# Patient Record
Sex: FEMALE | Race: WHITE | Employment: FULL TIME | URBAN - METROPOLITAN AREA
[De-identification: names, ages, dates, MRNs, and addresses within clinical notes are randomized per-mention and may not be internally consistent; named-entity substitution may affect disease eponyms.]

---

## 2019-04-08 ENCOUNTER — OFFICE VISIT (OUTPATIENT)
Dept: FAMILY MEDICINE CLINIC | Age: 34
End: 2019-04-08
Payer: COMMERCIAL

## 2019-04-08 VITALS
SYSTOLIC BLOOD PRESSURE: 154 MMHG | HEART RATE: 102 BPM | OXYGEN SATURATION: 98 % | TEMPERATURE: 98.2 F | DIASTOLIC BLOOD PRESSURE: 86 MMHG | WEIGHT: 204.8 LBS

## 2019-04-08 DIAGNOSIS — E11.9 TYPE 2 DIABETES MELLITUS WITHOUT COMPLICATION, WITHOUT LONG-TERM CURRENT USE OF INSULIN (HCC): Primary | ICD-10-CM

## 2019-04-08 DIAGNOSIS — Z13.220 LIPID SCREENING: ICD-10-CM

## 2019-04-08 DIAGNOSIS — Z11.4 ENCOUNTER FOR SCREENING FOR HIV: ICD-10-CM

## 2019-04-08 LAB — HBA1C MFR BLD: 9.8 %

## 2019-04-08 PROCEDURE — 99203 OFFICE O/P NEW LOW 30 MIN: CPT | Performed by: NURSE PRACTITIONER

## 2019-04-08 PROCEDURE — 83036 HEMOGLOBIN GLYCOSYLATED A1C: CPT | Performed by: NURSE PRACTITIONER

## 2019-04-08 RX ORDER — CETIRIZINE HYDROCHLORIDE 10 MG/1
10 TABLET ORAL DAILY
COMMUNITY

## 2019-04-08 RX ORDER — METFORMIN HYDROCHLORIDE 500 MG/1
1000 TABLET, EXTENDED RELEASE ORAL
Qty: 60 TABLET | Refills: 0 | Status: SHIPPED | OUTPATIENT
Start: 2019-04-15 | End: 2019-04-22 | Stop reason: SDUPTHER

## 2019-04-08 RX ORDER — BLOOD-GLUCOSE METER
1 KIT MISCELLANEOUS DAILY
Qty: 1 KIT | Refills: 0 | Status: SHIPPED | OUTPATIENT
Start: 2019-04-08

## 2019-04-08 RX ORDER — METFORMIN HYDROCHLORIDE 500 MG/1
500 TABLET, EXTENDED RELEASE ORAL
Qty: 7 TABLET | Refills: 0 | Status: SHIPPED | OUTPATIENT
Start: 2019-04-08 | End: 2019-04-22 | Stop reason: ALTCHOICE

## 2019-04-08 RX ORDER — ACYCLOVIR 400 MG/1
400 TABLET ORAL DAILY
COMMUNITY
Start: 2019-04-08 | End: 2020-04-08 | Stop reason: SDUPTHER

## 2019-04-08 RX ORDER — LANCETS 30 GAUGE
1 EACH MISCELLANEOUS 2 TIMES DAILY
Qty: 100 EACH | Refills: 5 | Status: SHIPPED | OUTPATIENT
Start: 2019-04-08

## 2019-04-08 RX ORDER — BLOOD SUGAR DIAGNOSTIC
1 STRIP MISCELLANEOUS DAILY
Qty: 100 EACH | Refills: 1 | Status: SHIPPED | OUTPATIENT
Start: 2019-04-08

## 2019-04-08 SDOH — HEALTH STABILITY: MENTAL HEALTH: HOW OFTEN DO YOU HAVE A DRINK CONTAINING ALCOHOL?: MONTHLY OR LESS

## 2019-04-08 ASSESSMENT — ENCOUNTER SYMPTOMS
VOMITING: 0
DIARRHEA: 0
NAUSEA: 0
RESPIRATORY NEGATIVE: 1

## 2019-04-08 NOTE — PATIENT INSTRUCTIONS
Patient Education        Learning About Diabetes Food Guidelines  Your Care Instructions    Meal planning is important to manage diabetes. It helps keep your blood sugar at a target level (which you set with your doctor). You don't have to eat special foods. You can eat what your family eats, including sweets once in a while. But you do have to pay attention to how often you eat and how much you eat of certain foods. You may want to work with a dietitian or a certified diabetes educator (CDE) to help you plan meals and snacks. A dietitian or CDE can also help you lose weight if that is one of your goals. What should you know about eating carbs? Managing the amount of carbohydrate (carbs) you eat is an important part of healthy meals when you have diabetes. Carbohydrate is found in many foods. · Learn which foods have carbs. And learn the amounts of carbs in different foods. ? Bread, cereal, pasta, and rice have about 15 grams of carbs in a serving. A serving is 1 slice of bread (1 ounce), ½ cup of cooked cereal, or 1/3 cup of cooked pasta or rice. ? Fruits have 15 grams of carbs in a serving. A serving is 1 small fresh fruit, such as an apple or orange; ½ of a banana; ½ cup of cooked or canned fruit; ½ cup of fruit juice; 1 cup of melon or raspberries; or 2 tablespoons of dried fruit. ? Milk and no-sugar-added yogurt have 15 grams of carbs in a serving. A serving is 1 cup of milk or 2/3 cup of no-sugar-added yogurt. ? Starchy vegetables have 15 grams of carbs in a serving. A serving is ½ cup of mashed potatoes or sweet potato; 1 cup winter squash; ½ of a small baked potato; ½ cup of cooked beans; or ½ cup cooked corn or green peas. · Learn how much carbs to eat each day and at each meal. A dietitian or CDE can teach you how to keep track of the amount of carbs you eat. This is called carbohydrate counting. · If you are not sure how to count carbohydrate grams, use the Plate Method to plan meals.  It is a good, quick way to make sure that you have a balanced meal. It also helps you spread carbs throughout the day. ? Divide your plate by types of foods. Put non-starchy vegetables on half the plate, meat or other protein food on one-quarter of the plate, and a grain or starchy vegetable in the final quarter of the plate. To this you can add a small piece of fruit and 1 cup of milk or yogurt, depending on how many carbs you are supposed to eat at a meal.  · Try to eat about the same amount of carbs at each meal. Do not \"save up\" your daily allowance of carbs to eat at one meal.  · Proteins have very little or no carbs per serving. Examples of proteins are beef, chicken, turkey, fish, eggs, tofu, cheese, cottage cheese, and peanut butter. A serving size of meat is 3 ounces, which is about the size of a deck of cards. Examples of meat substitute serving sizes (equal to 1 ounce of meat) are 1/4 cup of cottage cheese, 1 egg, 1 tablespoon of peanut butter, and ½ cup of tofu. How can you eat out and still eat healthy? · Learn to estimate the serving sizes of foods that have carbohydrate. If you measure food at home, it will be easier to estimate the amount in a serving of restaurant food. · If the meal you order has too much carbohydrate (such as potatoes, corn, or baked beans), ask to have a low-carbohydrate food instead. Ask for a salad or green vegetables. · If you use insulin, check your blood sugar before and after eating out to help you plan how much to eat in the future. · If you eat more carbohydrate at a meal than you had planned, take a walk or do other exercise. This will help lower your blood sugar. What else should you know? · Limit saturated fat, such as the fat from meat and dairy products. This is a healthy choice because people who have diabetes are at higher risk of heart disease. So choose lean cuts of meat and nonfat or low-fat dairy products.  Use olive or canola oil instead of butter or shortening healthy weight if that is one of your goals. What plan is right for you? Your dietitian or diabetes educator may suggest that you start with the plate format or carbohydrate counting. The plate format  The plate format is a simple way to help you manage how you eat. You plan meals by learning how much space each food should take on a plate. Using the plate format helps you spread carbohydrate throughout the day. It can make it easier to keep your blood sugar level within your target range. It also helps you see if you're eating healthy portion sizes. To use the plate format, you put non-starchy vegetables on half your plate. Add meat or meat substitutes on one-quarter of the plate. Put a grain or starchy vegetable (such as brown rice or a potato) on the final quarter of the plate. You can add a small piece of fruit and some low-fat or fat-free milk or yogurt, depending on your carbohydrate goal for each meal.  Here are some tips for using the plate format:  · Make sure that you are not using an oversized plate. A 9-inch plate is best. Many restaurants use larger plates. · Get used to using the plate format at home. Then you can use it when you eat out. · Write down your questions about using the plate format. Talk to your doctor, a dietitian, or a diabetes educator about your concerns. Carbohydrate counting  With carbohydrate counting, you plan meals based on the amount of carbohydrate in each food. Carbohydrate raises blood sugar higher and more quickly than any other nutrient. It is found in desserts, breads and cereals, and fruit. It's also found in starchy vegetables such as potatoes and corn, grains such as rice and pasta, and milk and yogurt. Spreading carbohydrate throughout the day helps keep your blood sugar levels within your target range.   Your daily amount depends on several things, including your weight, how active you are, which diabetes medicines you take, and what your goals are for your blood too.  · Use cookbooks or online recipes to plan several main meals. Plan some quick meals for busy nights. You also can double some recipes that freeze well. Then you can save half for other busy nights when you don't have time to cook. · Make sure you have the ingredients you need for your recipes. If you're running low on basic items, put these items on your shopping list too. · List foods that you use to make breakfasts, lunches, and snacks. List plenty of fruits and vegetables. · Post this list on the refrigerator. Add to it as you think of more things you need. · Take the list to the store to do your weekly shopping. Follow-up care is a key part of your treatment and safety. Be sure to make and go to all appointments, and call your doctor if you are having problems. It's also a good idea to know your test results and keep a list of the medicines you take. Where can you learn more? Go to https://[employer]. org and sign in to your [employer] account. Enter U186 in the [employer] box to learn more about \"Learning About Meal Planning for Diabetes. \"     If you do not have an account, please click on the \"Sign Up Now\" link. Current as of: July 25, 2018  Content Version: 11.9  © 8191-2276 [employer]. Care instructions adapted under license by [facility] [place] ([facility]). If you have questions about a medical condition or this instruction, always ask your healthcare professional. [place] [address] any warranty or liability for your use of this information. Patient Education        Learning About Metformin for Type 2 Diabetes  Introduction    Metformin (such as Glucophage) is a medicine used to treat type 2 diabetes. It helps keep blood sugar levels on target. You may have tried to eat a healthy diet, lose weight, and get more exercise to keep your blood sugar in your target range. If those things do not help, you may take a medicine called metformin.  It

## 2019-04-08 NOTE — PROGRESS NOTES
Paul French   28 y.o.  female  [id number]      Chief Complaint   Patient presents with    Discuss Medications        Subjective:  28 y. o.female is here for a follow up. She has the following chronic/acute medical problems: There is no problem list on file for this patient. Patient is here today with complaint of Type 2 diabetes. Patient states she has been off of her medications for a month or two. She states she was having trouble tolerating the medication. Patient states she is willing to try the Metformin XR. Patient is also concerned with her lipid levels. Patient states those numbers were not fasting. Review of Systems   Constitutional: Negative for appetite change, chills, fatigue and fever. HENT: Negative. Respiratory: Negative. Cardiovascular: Negative for chest pain and palpitations. Gastrointestinal: Negative for diarrhea, nausea and vomiting. Endocrine: Negative for polydipsia, polyphagia and polyuria. Skin: Negative for rash. Neurological: Negative for dizziness, light-headedness and headaches. Current Outpatient Medications   Medication Sig Dispense Refill    acyclovir (ZOVIRAX) 400 MG tablet       SPRINTEC 28 0.25-35 MG-MCG per tablet TAKE 1 TABLET BY MOUTH EVERY DAY AS DIRECTED  11    cetirizine (ZYRTEC ALLERGY) 10 MG tablet Take 10 mg by mouth daily      [START ON 4/15/2019] metFORMIN (GLUCOPHAGE-XR) 500 MG extended release tablet Take 2 tablets by mouth Daily with supper 60 tablet 0    metFORMIN (GLUCOPHAGE-XR) 500 MG extended release tablet Take 1 tablet by mouth Daily with supper for 7 days 7 tablet 0    glucose monitoring kit (FREESTYLE) monitoring kit 1 kit by Does not apply route daily Please dispense what insurance pays for.  1 kit 0    Lancets MISC 1 each by Does not apply route 2 times daily 100 each 5    Alcohol Swabs (ALCOHOL PADS) 70 % PADS 1 applicator by Does not apply route daily 100 each 1    blood glucose test strips (EXACTECH TEST) strip Blood sugar check daily Please dispense what insurance pays for. 100 each 3     No current facility-administered medications for this visit. Past medical, family,surgical history reviewed today. Objective:  BP (!) 154/86   Pulse 102   Temp 98.2 °F (36.8 °C) (Oral)   Wt 204 lb 12.8 oz (92.9 kg)   LMP 03/19/2019 (Exact Date)   SpO2 98%   Breastfeeding? No   BP Readings from Last 3 Encounters:   04/08/19 (!) 154/86     Wt Readings from Last 3 Encounters:   04/08/19 204 lb 12.8 oz (92.9 kg)         Physical Exam   Constitutional: She is oriented to person, place, and time. She appears well-developed and well-nourished. HENT:   Head: Normocephalic. Neck: Neck supple. Cardiovascular: Normal rate, regular rhythm and normal heart sounds. Pulmonary/Chest: Effort normal and breath sounds normal.   Neurological: She is alert and oriented to person, place, and time. Skin: Skin is warm and dry. Psychiatric: She has a normal mood and affect.  Her behavior is normal.       Lab Results   Component Value Date    WBC 9.8 01/24/2011    HGB 14.4 01/24/2011    HCT 42.5 01/24/2011    MCV 87.6 01/24/2011     (H) 01/24/2011     Lab Results   Component Value Date     01/24/2011    K 4.5 01/24/2011     01/24/2011    CO2 25 01/24/2011    BUN 9 01/24/2011    CREATININE 0.7 01/24/2011    CALCIUM 9.8 01/24/2011    PROT 7.6 01/24/2011    LABALBU 4.9 01/24/2011    BILITOT 0.4 01/24/2011    ALKPHOS 81 01/24/2011    AST 25 01/24/2011    ALT 29 01/24/2011    LABGLOM >60 01/24/2011    GFRAA >60 01/24/2011     Lab Results   Component Value Date    CHOL 251 (H) 01/24/2011     Lab Results   Component Value Date    TRIG 206 (H) 01/24/2011     Lab Results   Component Value Date    HDL 52 (L) 01/24/2011     No results found for: [address], LDLCHOLESTEROL  Lab Results   Component Value Date    LABA1C 9.8 04/08/2019     Lab Results   Component Value Date    TSHHS 2.063 01/24/2011     Results for orders placed or performed in visit on 04/08/19   POCT glycosylated hemoglobin (Hb A1C)   Result Value Ref Range    Hemoglobin A1C 9.8 %         ASSESSMENT/PLAN:      1. Type 2 diabetes mellitus without complication, without long-term current use of insulin ([facility]  Advised patient to check her blood sugars first thing in the AM and keep a log and bring to next appointment. Discussed lifestyle modifications. Attached diabetic teaching handouts to after visit summary. Patient to follow up in two weeks. Advised patient to call sooner if unable to tolerate the medication.   - POCT glycosylated hemoglobin (Hb A1C)  - metFORMIN (GLUCOPHAGE-XR) 500 MG extended release tablet; Take 2 tablets by mouth Daily with supper  Dispense: 60 tablet; Refill: 0  - metFORMIN (GLUCOPHAGE-XR) 500 MG extended release tablet; Take 1 tablet by mouth Daily with supper for 7 days  Dispense: 7 tablet; Refill: 0  - glucose monitoring kit (FREESTYLE) monitoring kit; 1 kit by Does not apply route daily Please dispense what insurance pays for. Dispense: 1 kit; Refill: 0  - Lancets MISC; 1 each by Does not apply route 2 times daily  Dispense: 100 each; Refill: 5  - Alcohol Swabs (ALCOHOL PADS) 70 % PADS; 1 applicator by Does not apply route daily  Dispense: 100 each; Refill: 1  - blood glucose test strips (EXACTECH TEST) strip; Blood sugar check daily Please dispense what insurance pays for. Dispense: 100 each; Refill: 3  - CBC WITH AUTO DIFFERENTIAL; Future  - COMPREHENSIVE METABOLIC PANEL; Future    2. Lipid screening  - LIPID PANEL; Future    3. Encounter for screening for HIV  - HIV-1 AND HIV-2 ANTIBODIES; Future    The patients records were reviewed and discussed. Time was given for questions . All questions were answered to the patients satisfaction. A total time of 30 was spent with at least 50% related to counseling and coordination of care. There are no discontinued medications. Care discussed with patient. Questions answered. Patient verbalizes understanding and agrees with plan. After visit summary provided. Advised to call for any problems, questions, or concerns. Return in about 2 weeks (around 4/22/2019).                                          Signed:  NEHA Cortes CNP  04/08/19  7:39 PM

## 2019-04-16 ENCOUNTER — HOSPITAL ENCOUNTER (OUTPATIENT)
Age: 34
Discharge: HOME | End: 2019-04-16
Payer: COMMERCIAL

## 2019-04-16 LAB
ALBUMIN SERPL-MCNC: 4.1 GM/DL (ref 3.4–5)
ALP BLD-CCNC: 75 IU/L (ref 40–129)
ALT SERPL-CCNC: 19 U/L (ref 10–40)
ANION GAP SERPL CALCULATED.3IONS-SCNC: 15 MMOL/L (ref 4–16)
AST SERPL-CCNC: 25 IU/L (ref 15–37)
BASOPHILS ABSOLUTE: 0.1 K/CU MM
BASOPHILS RELATIVE PERCENT: 1 % (ref 0–1)
BILIRUB SERPL-MCNC: 0.2 MG/DL (ref 0–1)
BUN BLDV-MCNC: 8 MG/DL (ref 6–23)
CALCIUM SERPL-MCNC: 9.2 MG/DL (ref 8.3–10.6)
CHLORIDE BLD-SCNC: 105 MMOL/L (ref 99–110)
CHOLESTEROL: 300 MG/DL
CO2: 23 MMOL/L (ref 21–32)
CREAT SERPL-MCNC: 0.6 MG/DL (ref 0.6–1.1)
DIFFERENTIAL TYPE: ABNORMAL
EOSINOPHILS ABSOLUTE: 0.7 K/CU MM
EOSINOPHILS RELATIVE PERCENT: 8.6 % (ref 0–3)
GFR AFRICAN AMERICAN: >60 ML/MIN/1.73M2
GFR NON-AFRICAN AMERICAN: >60 ML/MIN/1.73M2
GLUCOSE FASTING: 159 MG/DL (ref 70–99)
HCT VFR BLD CALC: 43.5 % (ref 37–47)
HDLC SERPL-MCNC: 70 MG/DL
HEMOGLOBIN: 13.3 GM/DL (ref 12.5–16)
HIV SCREEN: NON REACTIVE
IMMATURE NEUTROPHIL %: 0.4 % (ref 0–0.43)
LDL CHOLESTEROL DIRECT: 221 MG/DL
LYMPHOCYTES ABSOLUTE: 3 K/CU MM
LYMPHOCYTES RELATIVE PERCENT: 37.3 % (ref 24–44)
MCH RBC QN AUTO: 28.1 PG (ref 27–31)
MCHC RBC AUTO-ENTMCNC: 30.6 % (ref 32–36)
MCV RBC AUTO: 92 FL (ref 78–100)
MONOCYTES ABSOLUTE: 0.7 K/CU MM
MONOCYTES RELATIVE PERCENT: 8.1 % (ref 0–4)
NUCLEATED RBC %: 0 %
PDW BLD-RTO: 12.9 % (ref 11.7–14.9)
PLATELET # BLD: 387 K/CU MM (ref 140–440)
PMV BLD AUTO: 10.2 FL (ref 7.5–11.1)
POTASSIUM SERPL-SCNC: 4.4 MMOL/L (ref 3.5–5.1)
RBC # BLD: 4.73 M/CU MM (ref 4.2–5.4)
SEGMENTED NEUTROPHILS ABSOLUTE COUNT: 3.6 K/CU MM
SEGMENTED NEUTROPHILS RELATIVE PERCENT: 44.6 % (ref 36–66)
SODIUM BLD-SCNC: 143 MMOL/L (ref 135–145)
TOTAL IMMATURE NEUTOROPHIL: 0.03 K/CU MM
TOTAL NUCLEATED RBC: 0 K/CU MM
TOTAL PROTEIN: 6.9 GM/DL (ref 6.4–8.2)
TRIGL SERPL-MCNC: 228 MG/DL
WBC # BLD: 8.1 K/CU MM (ref 4–10.5)

## 2019-04-16 PROCEDURE — 85025 COMPLETE CBC W/AUTO DIFF WBC: CPT

## 2019-04-16 PROCEDURE — 80053 COMPREHEN METABOLIC PANEL: CPT

## 2019-04-16 PROCEDURE — 80061 LIPID PANEL: CPT

## 2019-04-16 PROCEDURE — 36415 COLL VENOUS BLD VENIPUNCTURE: CPT

## 2019-04-16 PROCEDURE — 87389 HIV-1 AG W/HIV-1&-2 AB AG IA: CPT

## 2019-04-16 PROCEDURE — 83721 ASSAY OF BLOOD LIPOPROTEIN: CPT

## 2019-04-18 DIAGNOSIS — E78.2 MIXED HYPERLIPIDEMIA: Primary | ICD-10-CM

## 2019-04-18 RX ORDER — ATORVASTATIN CALCIUM 20 MG/1
20 TABLET, FILM COATED ORAL DAILY
Qty: 30 TABLET | Refills: 1 | Status: SHIPPED | OUTPATIENT
Start: 2019-04-18 | End: 2019-06-13

## 2019-04-22 ENCOUNTER — OFFICE VISIT (OUTPATIENT)
Dept: FAMILY MEDICINE CLINIC | Age: 34
End: 2019-04-22
Payer: COMMERCIAL

## 2019-04-22 ENCOUNTER — TELEPHONE (OUTPATIENT)
Dept: FAMILY MEDICINE CLINIC | Age: 34
End: 2019-04-22

## 2019-04-22 VITALS
HEART RATE: 90 BPM | DIASTOLIC BLOOD PRESSURE: 76 MMHG | OXYGEN SATURATION: 98 % | WEIGHT: 204 LBS | TEMPERATURE: 98.5 F | SYSTOLIC BLOOD PRESSURE: 144 MMHG

## 2019-04-22 DIAGNOSIS — E78.2 MIXED HYPERLIPIDEMIA: ICD-10-CM

## 2019-04-22 DIAGNOSIS — E11.9 TYPE 2 DIABETES MELLITUS WITHOUT COMPLICATION, WITHOUT LONG-TERM CURRENT USE OF INSULIN (HCC): Primary | ICD-10-CM

## 2019-04-22 DIAGNOSIS — R80.9 MICROALBUMINURIA DUE TO TYPE 2 DIABETES MELLITUS (HCC): ICD-10-CM

## 2019-04-22 DIAGNOSIS — E11.29 MICROALBUMINURIA DUE TO TYPE 2 DIABETES MELLITUS (HCC): ICD-10-CM

## 2019-04-22 LAB
CREATININE URINE POCT: ABNORMAL
MICROALBUMIN/CREAT 24H UR: ABNORMAL MG/G{CREAT}
MICROALBUMIN/CREAT UR-RTO: ABNORMAL

## 2019-04-22 PROCEDURE — 82044 UR ALBUMIN SEMIQUANTITATIVE: CPT | Performed by: NURSE PRACTITIONER

## 2019-04-22 PROCEDURE — 99213 OFFICE O/P EST LOW 20 MIN: CPT | Performed by: NURSE PRACTITIONER

## 2019-04-22 RX ORDER — METFORMIN HYDROCHLORIDE 1000 MG/1
1000 TABLET, FILM COATED, EXTENDED RELEASE ORAL
Qty: 90 TABLET | Refills: 0 | Status: SHIPPED | OUTPATIENT
Start: 2019-04-22 | End: 2019-04-22 | Stop reason: SDUPTHER

## 2019-04-22 RX ORDER — LISINOPRIL 2.5 MG/1
2.5 TABLET ORAL DAILY
Qty: 30 TABLET | Refills: 1 | Status: SHIPPED | OUTPATIENT
Start: 2019-04-22 | End: 2019-05-14 | Stop reason: SDUPTHER

## 2019-04-22 RX ORDER — METFORMIN HYDROCHLORIDE 500 MG/1
1000 TABLET, EXTENDED RELEASE ORAL
Qty: 180 TABLET | Refills: 0 | Status: SHIPPED | OUTPATIENT
Start: 2019-04-22 | End: 2019-06-13 | Stop reason: SDUPTHER

## 2019-04-22 NOTE — PROGRESS NOTES
Oc French   28 y.o.  female  [id number]      Chief Complaint   Patient presents with    Diabetes     follow up         Subjective:  28 y. o.female is here for a follow up. She has the following chronic/acute medical problems:  Patient Active Problem List   Diagnosis    Type 2 diabetes mellitus without complication, without long-term current use of insulin ([facility] [address]    Mixed hyperlipidemia     Patient is here for a follow up on diabetes. Patient states for the most part she is tolerating the Metformin ER. Patient states she is working on lifestyle modifications. Diabetes   She presents for her follow-up diabetic visit. She has type 2 diabetes mellitus. No MedicAlert identification noted. Her disease course has been improving. There are no hypoglycemic associated symptoms. Pertinent negatives for hypoglycemia include no dizziness or headaches. There are no diabetic associated symptoms. Pertinent negatives for diabetes include no chest pain, no fatigue, no polydipsia, no polyphagia and no polyuria. There are no hypoglycemic complications. Symptoms are stable. There are no diabetic complications. Risk factors for coronary artery disease include diabetes mellitus and dyslipidemia. Current diabetic treatment includes oral agent (monotherapy). She is compliant with treatment all of the time. Her weight is decreasing steadily. She is following a generally healthy (working on trying to eat healthier) diet. Meal planning includes avoidance of concentrated sweets. She has not had a previous visit with a dietitian. She rarely participates in exercise. Her breakfast blood glucose is taken between 6-7 am. Her breakfast blood glucose range is generally 140-180 mg/dl. An ACE inhibitor/angiotensin II receptor blocker is not being taken. She does not see a podiatrist.Eye exam is not current. Review of Systems   Constitutional: Negative for appetite change, chills, fatigue and fever. HENT: Negative.     Respiratory: Neck supple. Cardiovascular: Normal rate, regular rhythm and normal heart sounds. Pulmonary/Chest: Effort normal and breath sounds normal.   Neurological: She is alert and oriented to person, place, and time. Skin: Skin is warm and dry. Psychiatric: She has a normal mood and affect. Her behavior is normal.       Lab Results   Component Value Date    WBC 8.1 04/16/2019    HGB 13.3 04/16/2019    HCT 43.5 04/16/2019    MCV 92.0 04/16/2019     04/16/2019     Lab Results   Component Value Date     04/16/2019    K 4.4 04/16/2019     04/16/2019    CO2 23 04/16/2019    BUN 8 04/16/2019    CREATININE 0.6 04/16/2019    CALCIUM 9.2 04/16/2019    PROT 6.9 04/16/2019    LABALBU 4.1 04/16/2019    BILITOT 0.2 04/16/2019    ALKPHOS 75 04/16/2019    AST 25 04/16/2019    ALT 19 04/16/2019    LABGLOM >60 04/16/2019    GFRAA >60 04/16/2019     Lab Results   Component Value Date    CHOL 300 (H) 04/16/2019    CHOL 251 (H) 01/24/2011     Lab Results   Component Value Date    TRIG 228 (H) 04/16/2019    TRIG 206 (H) 01/24/2011     Lab Results   Component Value Date    HDL 70 04/16/2019    HDL 52 (L) 01/24/2011     No results found for: LDLCALC, LDLCHOLESTEROL  Lab Results   Component Value Date    LABA1C 9.8 04/08/2019     Lab Results   Component Value Date    TSHHS 2.063 01/24/2011     Results for orders placed or performed in visit on 04/22/19   POCT microalbumin   Result Value Ref Range    Microalb, Ur 80 mg/L     Creatinine Ur POCT 200 mg/dL     Microalbumin Creatinine Ratio  mg/g            ASSESSMENT/PLAN:    1. Type 2 diabetes mellitus without complication, without long-term current use of insulin ([address]  Patient tolerating Metformin well. Patient wants to work hard on diet modifications. Patient does not want her medication increased at this time. She believes with this dose and diet modifications she be at target. Discussed exercising and weight loss.  Advised patient to continue to check her blood sugar first thing in the morning. Advised patient to bring her blood sugar log to the next appointment. Follow up in one month. - POCT microalbumin  - lisinopril (PRINIVIL;ZESTRIL) 2.5 MG tablet; Take 1 tablet by mouth daily  Dispense: 30 tablet; Refill: 1  - metFORMIN, MOD, (GLUCOPHAGE-XR) 500 MG extended release tablet; Take 2 tablets by mouth Daily with supper  Dispense: 180 tablet; Refill: 0    2. Mixed hyperlipidemia  Patient tolerating atorvastatin without any side effects. 3. Microalbuminuria due to type 2 diabetes mellitus (HCC)  - lisinopril (PRINIVIL;ZESTRIL) 2.5 MG tablet; Take 1 tablet by mouth daily  Dispense: 30 tablet; Refill: 1                Medications Discontinued During This Encounter   Medication Reason    metFORMIN (GLUCOPHAGE-XR) 500 MG extended release tablet Therapy completed    metFORMIN (GLUCOPHAGE-XR) 500 MG extended release tablet REORDER    metFORMIN (GLUMETZA) 1000 MG extended release tablet [address] discussed with patient. Questions answered. Patient verbalizes understanding and agrees with plan. After visit summary provided. Advised to call for any problems, questions, or concerns. Return in about 1 month (around 5/22/2019).                                              Signed:  Hazel Merlin, APRN - CNP  04/24/19  12:49 PM

## 2019-04-24 ENCOUNTER — OFFICE VISIT (OUTPATIENT)
Dept: FAMILY MEDICINE CLINIC | Age: 34
End: 2019-04-24
Payer: COMMERCIAL

## 2019-04-24 VITALS
TEMPERATURE: 98 F | HEIGHT: 63 IN | WEIGHT: 204.4 LBS | SYSTOLIC BLOOD PRESSURE: 132 MMHG | HEART RATE: 85 BPM | DIASTOLIC BLOOD PRESSURE: 84 MMHG | OXYGEN SATURATION: 99 % | BODY MASS INDEX: 36.21 KG/M2

## 2019-04-24 DIAGNOSIS — T50.905A ADVERSE REACTION TO DRUG, INITIAL ENCOUNTER: Primary | ICD-10-CM

## 2019-04-24 DIAGNOSIS — R20.0 NUMBNESS AND TINGLING SENSATION OF SKIN: ICD-10-CM

## 2019-04-24 DIAGNOSIS — R20.2 NUMBNESS AND TINGLING SENSATION OF SKIN: ICD-10-CM

## 2019-04-24 PROBLEM — E11.9 TYPE 2 DIABETES MELLITUS WITHOUT COMPLICATION, WITHOUT LONG-TERM CURRENT USE OF INSULIN (HCC): Status: ACTIVE | Noted: 2019-04-24

## 2019-04-24 PROBLEM — E78.2 MIXED HYPERLIPIDEMIA: Status: ACTIVE | Noted: 2019-04-24

## 2019-04-24 PROCEDURE — 99213 OFFICE O/P EST LOW 20 MIN: CPT | Performed by: NURSE PRACTITIONER

## 2019-04-24 ASSESSMENT — PATIENT HEALTH QUESTIONNAIRE - PHQ9
SUM OF ALL RESPONSES TO PHQ QUESTIONS 1-9: 0
SUM OF ALL RESPONSES TO PHQ9 QUESTIONS 1 & 2: 0
1. LITTLE INTEREST OR PLEASURE IN DOING THINGS: 0
2. FEELING DOWN, DEPRESSED OR HOPELESS: 0
SUM OF ALL RESPONSES TO PHQ QUESTIONS 1-9: 0

## 2019-04-24 ASSESSMENT — ENCOUNTER SYMPTOMS
VOMITING: 0
DIARRHEA: 0
NAUSEA: 0
RESPIRATORY NEGATIVE: 1

## 2019-04-24 NOTE — PATIENT INSTRUCTIONS
Increase fluids, rest  Continue dietary changes  Continue to increase activity - at least 30 minutes of walk every day for mental and physical health  Stop Lipitor  Start fish oil - 800 to 1000 IU daily  Keep follow up appointment scheduled for May 22nd  Verbalized understanding and agreement with plan

## 2019-04-24 NOTE — PROGRESS NOTES
Crystal Eans  1985  28 y.o. SUBJECT WALT:    Chief Complaint   Patient presents with    Other     R side lip/nose/finger numbness       Jennifer Zhao is a 28year old female who is in with complaints of 2 short episodes of upper right lip numbness and right hand fingertip numbness. She states the episodes only last about 5 minutes. She initially thought it was a migraine starting but has not had any headaches. She was started on Lipitor 4 days ago and thought this may be a reaction to the Lipitor. She states she did not take any of the medication for the last 2 days. She states she will be restarting Fish Oil. Other   This is a new problem. The current episode started in the past 7 days. The problem occurs intermittently. The problem has been waxing and waning. Associated symptoms include numbness (upper right lip, fingertips of right hand). Pertinent negatives include no chest pain, chills, congestion, coughing, diaphoresis, fatigue, fever, headaches, nausea, rash, sore throat, vertigo or visual change. Nothing aggravates the symptoms. She has tried nothing for the symptoms. Current Outpatient Medications on File Prior to Visit   Medication Sig Dispense Refill    lisinopril (PRINIVIL;ZESTRIL) 2.5 MG tablet Take 1 tablet by mouth daily 30 tablet 1    metFORMIN, MOD, (GLUCOPHAGE-XR) 500 MG extended release tablet Take 2 tablets by mouth Daily with supper 180 tablet 0    atorvastatin (LIPITOR) 20 MG tablet Take 1 tablet by mouth daily 30 tablet 1    acyclovir (ZOVIRAX) 400 MG tablet       SPRINTEC 28 0.25-35 MG-MCG per tablet TAKE 1 TABLET BY MOUTH EVERY DAY AS DIRECTED  11    cetirizine (ZYRTEC ALLERGY) 10 MG tablet Take 10 mg by mouth daily      glucose monitoring kit (FREESTYLE) monitoring kit 1 kit by Does not apply route daily Please dispense what insurance pays for.  1 kit 0    Lancets MISC 1 each by Does not apply route 2 times daily 100 each 5    Alcohol Swabs (ALCOHOL PADS) 70 % PADS 1 applicator by Does not apply route daily 100 each 1    blood glucose test strips (EXACTECH TEST) strip Blood sugar check daily Please dispense what insurance pays for. 100 each 3     No current facility-administered medications on file prior to visit. History reviewed. No pertinent past medical history. History reviewed. No pertinent surgical history. History reviewed. No pertinent family history. Social History     Socioeconomic History    Marital status: Single     Spouse name: Not on file    Number of children: Not on file    Years of education: Not on file    Highest education level: Not on file   Occupational History    Not on file   Social Needs    Financial resource strain: Not on file    Food insecurity:     Worry: Not on file     Inability: Not on file    Transportation needs:     Medical: Not on file     Non-medical: Not on file   Tobacco Use    Smoking status: Never Smoker    Smokeless tobacco: Never Used   Substance and Sexual Activity    Alcohol use: Yes     Frequency: Monthly or less    Drug use: Never    Sexual activity: Not on file   Lifestyle    Physical activity:     Days per week: Not on file     Minutes per session: Not on file    Stress: Not on file   Relationships    Social connections:     Talks on phone: Not on file     Gets together: Not on file     Attends [employer] service: Not on file     Active member of club or organization: Not on file     Attends meetings of clubs or organizations: Not on file     Relationship status: Not on file    Intimate partner violence:     Fear of current or ex partner: Not on file     Emotionally abused: Not on file     Physically abused: Not on file     Forced sexual activity: Not on file   Other Topics Concern    Not on file   Social History Narrative    Not on file       Review of Systems   Constitutional: Negative for activity change, appetite change, chills, diaphoresis, fatigue and fever.    HENT: Negative for congestion, ear pain, facial swelling, mouth sores, sinus pressure, sinus pain, sore throat and trouble swallowing. Respiratory: Negative for cough and shortness of breath. Cardiovascular: Negative for chest pain. Gastrointestinal: Negative for nausea. Skin: Negative for rash. Neurological: Positive for numbness (upper right lip, fingertips of right hand). Negative for vertigo and headaches. OBJECTIVE:     /84 (Site: Left Upper Arm, Position: Sitting, Cuff Size: Large Adult)   Pulse 85   Temp 98 °F (36.7 °C)   Ht 5' 3\" (1.6 m)   Wt 204 lb 6.4 oz (92.7 kg)   LMP 04/16/2019 (Approximate)   SpO2 99%   BMI 36.21 kg/m²     Physical Exam   Constitutional: She is oriented to person, place, and time. She appears well-developed and well-nourished. No distress. HENT:   Head: Normocephalic and atraumatic. Right Ear: External ear normal.   Left Ear: External ear normal.   Nose: Nose normal.   Mouth/Throat: Oropharynx is clear and moist.   Eyes: Pupils are equal, round, and reactive to light. Conjunctivae and EOM are normal.   Neck: Normal range of motion. Neck supple. Cardiovascular: Normal rate, regular rhythm and normal heart sounds. Pulmonary/Chest: Effort normal and breath sounds normal.   Abdominal: Soft. Musculoskeletal: Normal range of motion. She exhibits no edema or tenderness. Neurological: She is alert and oriented to person, place, and time. She displays normal reflexes. No cranial nerve deficit or sensory deficit. Skin: Skin is warm and dry. She is not diaphoretic. Psychiatric: She has a normal mood and affect. Her behavior is normal. Judgment and thought content normal.   Vitals reviewed.       Results in Past 30 Days  Result Component Current Result Ref Range Previous Result Ref Range   Alb 4.1 (4/16/2019) 3.4 - 5.0 GM/DL Not in Time Range    Alkaline Phosphatase 75 (4/16/2019) 40 - 129 IU/L Not in Time Range    ALT 19 (4/16/2019) 10 - 40 U/L Not in Time Range    AST 25 (4/16/2019) 15 - 37 IU/L Not in Time Range    BUN 8 (4/16/2019) 6 - 23 MG/DL Not in Time Range    Calcium 9.2 (4/16/2019) 8.3 - 10.6 MG/DL Not in Time Range    Chloride 105 (4/16/2019) 99 - 110 mMol/L Not in Time Range    CO2 23 (4/16/2019) 21 - 32 MMOL/L Not in Time Range    CREATININE 0.6 (4/16/2019) 0.6 - 1.1 MG/DL Not in Time Range    GFR  >60 (4/16/2019) >60 mL/min/1.73m2 Not in Time Range    GFR Non- >60 (4/16/2019) >60 mL/min/1.73m2 Not in Time Range    Potassium 4.4 (4/16/2019) 3.5 - 5.1 MMOL/L Not in Time Range    Sodium 143 (4/16/2019) 135 - 145 MMOL/L Not in Time Range    Total Bilirubin 0.2 (4/16/2019) 0.0 - 1.0 MG/DL Not in Time Range    Total Protein 6.9 (4/16/2019) 6.4 - 8.2 GM/DL Not in Time Range      Hemoglobin A1C (%)   Date Value   04/08/2019 9.8       Lab Results   Component Value Date    WBC 8.1 04/16/2019    WBC 9.8 01/24/2011    HGB 13.3 04/16/2019    HGB 14.4 01/24/2011    HCT 43.5 04/16/2019    HCT 42.5 01/24/2011    MCV 92.0 04/16/2019    MCV 87.6 01/24/2011     04/16/2019     01/24/2011    SEGSABS 3.6 04/16/2019    SEGSABS 6.3 01/24/2011    LYMPHSABS 3.0 04/16/2019    MONOSABS 0.7 04/16/2019    EOSABS 0.7 04/16/2019    BASOSABS 0.1 04/16/2019     Lab Results   Component Value Date    TSHHS 2.063 01/24/2011     Lab Results   Component Value Date    LABALBU 4.1 04/16/2019    BILITOT 0.2 04/16/2019    AST 25 04/16/2019    ALT 19 04/16/2019    ALKPHOS 75 04/16/2019             No results found for this visit on 04/24/19. ASSESSMENT AND PLAN:     1. Adverse reaction to drug, initial encounter  - discontinue Lipitor    2.  Numbness and tingling sensation of skin    Increase fluids, rest  Continue dietary changes  Continue to increase activity - at least 30 minutes of walk every day for mental and physical health  Stop Lipitor  Start fish oil - 800 to 1000 IU daily  Keep follow up appointment   Verbalized understanding and agreement with plan    Return if symptoms worsen or fail to improve. Care discussed with patient. Patient educated on signs and symptoms of exacerbation and when to seek further medical attention. Advised to call for any problems, questions, or concerns. Patient verbalizes understanding and agrees with plan. Medications reviewed and reconciled. Continue current medications. Appropriate prescriptions are ordered. Risks and benefits of meds are discussed. After visit summary provided.

## 2019-04-25 ASSESSMENT — ENCOUNTER SYMPTOMS
SINUS PAIN: 0
SORE THROAT: 0
TROUBLE SWALLOWING: 0
VISUAL CHANGE: 0
SINUS PRESSURE: 0
SHORTNESS OF BREATH: 0
FACIAL SWELLING: 0
COUGH: 0
NAUSEA: 0

## 2019-05-11 LAB — DIABETIC RETINOPATHY: NEGATIVE

## 2019-05-14 DIAGNOSIS — E11.29 MICROALBUMINURIA DUE TO TYPE 2 DIABETES MELLITUS (HCC): ICD-10-CM

## 2019-05-14 DIAGNOSIS — R80.9 MICROALBUMINURIA DUE TO TYPE 2 DIABETES MELLITUS (HCC): ICD-10-CM

## 2019-05-14 DIAGNOSIS — E11.9 TYPE 2 DIABETES MELLITUS WITHOUT COMPLICATION, WITHOUT LONG-TERM CURRENT USE OF INSULIN (HCC): ICD-10-CM

## 2019-05-14 RX ORDER — LISINOPRIL 2.5 MG/1
TABLET ORAL
Qty: 30 TABLET | Refills: 0 | Status: SHIPPED | OUTPATIENT
Start: 2019-05-14 | End: 2019-06-13 | Stop reason: SDUPTHER

## 2019-06-13 ENCOUNTER — OFFICE VISIT (OUTPATIENT)
Dept: FAMILY MEDICINE CLINIC | Age: 34
End: 2019-06-13
Payer: COMMERCIAL

## 2019-06-13 VITALS
HEART RATE: 61 BPM | DIASTOLIC BLOOD PRESSURE: 72 MMHG | SYSTOLIC BLOOD PRESSURE: 110 MMHG | HEIGHT: 63 IN | WEIGHT: 206.8 LBS | BODY MASS INDEX: 36.64 KG/M2

## 2019-06-13 DIAGNOSIS — E11.29 TYPE 2 DIABETES MELLITUS WITH MICROALBUMINURIA, WITHOUT LONG-TERM CURRENT USE OF INSULIN (HCC): Primary | ICD-10-CM

## 2019-06-13 DIAGNOSIS — Z91.09 ENVIRONMENTAL ALLERGIES: ICD-10-CM

## 2019-06-13 DIAGNOSIS — Z23 NEED FOR HEPATITIS B VACCINATION: ICD-10-CM

## 2019-06-13 DIAGNOSIS — E78.2 MIXED HYPERLIPIDEMIA: ICD-10-CM

## 2019-06-13 DIAGNOSIS — E66.01 SEVERE OBESITY WITH BODY MASS INDEX (BMI) OF 35.0 TO 39.9 WITH SERIOUS COMORBIDITY (HCC): ICD-10-CM

## 2019-06-13 DIAGNOSIS — Z77.22 EXPOSURE TO SECOND HAND TOBACCO SMOKE: ICD-10-CM

## 2019-06-13 DIAGNOSIS — A60.04 HERPES SIMPLEX VULVOVAGINITIS: ICD-10-CM

## 2019-06-13 DIAGNOSIS — R80.9 TYPE 2 DIABETES MELLITUS WITH MICROALBUMINURIA, WITHOUT LONG-TERM CURRENT USE OF INSULIN (HCC): Primary | ICD-10-CM

## 2019-06-13 PROCEDURE — 99203 OFFICE O/P NEW LOW 30 MIN: CPT | Performed by: FAMILY MEDICINE

## 2019-06-13 PROCEDURE — 90746 HEPB VACCINE 3 DOSE ADULT IM: CPT | Performed by: FAMILY MEDICINE

## 2019-06-13 PROCEDURE — 90471 IMMUNIZATION ADMIN: CPT | Performed by: FAMILY MEDICINE

## 2019-06-13 RX ORDER — FLUTICASONE PROPIONATE 50 MCG
1 SPRAY, SUSPENSION (ML) NASAL DAILY
Qty: 1 BOTTLE | Refills: 2 | Status: SHIPPED | OUTPATIENT
Start: 2019-06-13 | End: 2019-10-02 | Stop reason: SDUPTHER

## 2019-06-13 RX ORDER — METFORMIN HYDROCHLORIDE 500 MG/1
1000 TABLET, EXTENDED RELEASE ORAL
Qty: 180 TABLET | Refills: 0 | Status: SHIPPED | OUTPATIENT
Start: 2019-06-13 | End: 2019-10-02 | Stop reason: SDUPTHER

## 2019-06-13 RX ORDER — LISINOPRIL 2.5 MG/1
2.5 TABLET ORAL DAILY
Qty: 90 TABLET | Refills: 0 | Status: SHIPPED | OUTPATIENT
Start: 2019-06-13 | End: 2019-10-02 | Stop reason: SDUPTHER

## 2019-06-13 NOTE — PROGRESS NOTES
Patient ID: Joaquin Thakkar 1985    . Chief Complaint   Patient presents with    Diabetes     B/S avg at  150's    Hyperlipidemia    Allergic Reaction     Zyrtec isn't working         Diabetes   She presents for her follow-up diabetic visit. She has type 2 diabetes mellitus. Her disease course has been improving. There are no hypoglycemic complications. Symptoms are stable. Risk factors for coronary artery disease include obesity. Current diabetic treatment includes oral agent (dual therapy). She is compliant with treatment some of the time. Her weight is stable. Diabetic current diet: high calorie. Her overall blood glucose range is 140-180 mg/dl. Hyperlipidemia   The current episode started more than 1 year ago. Recent lipid tests were reviewed and are high. Treatments tried: unable to tolerate statin. Compliance problems include adherence to diet and adherence to exercise. Allergies: Has tried allergy medicines the one that seems to work the best his Zyrtec. Has Flonase as well. Genital herpes: Has been on Valtrex ever since. Had one outbreak. The nurse practitioner put her on it for her lifetime. Knows to use condoms if sexually active. Review of Systems   All other systems reviewed and are negative. Patient Active Problem List   Diagnosis    Type 2 diabetes mellitus without complication, without long-term current use of insulin ([address]    Mixed hyperlipidemia    Exposure to second hand tobacco smoke    Severe obesity with body mass index (BMI) of 35.0 to 39.9 with serious comorbidity ([address]       No past medical history on file. No past surgical history on file. No family history on file.     Current Outpatient Medications on File Prior to Visit   Medication Sig Dispense Refill    acyclovir (ZOVIRAX) 400 MG tablet Take 400 mg by mouth daily       SPRINTEC 28 0.25-35 MG-MCG per tablet TAKE 1 TABLET BY MOUTH EVERY DAY AS DIRECTED  11    cetirizine (ZYRTEC ALLERGY) 10 MG tablet Take 10 mg by mouth daily      glucose monitoring kit (FREESTYLE) monitoring kit 1 kit by Does not apply route daily Please dispense what insurance pays for. 1 kit 0    Lancets MISC 1 each by Does not apply route 2 times daily 100 each 5    Alcohol Swabs (ALCOHOL PADS) 70 % PADS 1 applicator by Does not apply route daily 100 each 1    blood glucose test strips (EXACTECH TEST) strip Blood sugar check daily Please dispense what insurance pays for. 100 each 3     No current facility-administered medications on file prior to visit. Objective:         Physical Exam   Constitutional: She is oriented to person, place, and time. She appears well-developed and well-nourished. No distress. , Crying when talking about her genital herpes    Obese   HENT:   Head: Normocephalic and atraumatic. Right Ear: Hearing, tympanic membrane and external ear normal.   Left Ear: Hearing, tympanic membrane and external ear normal.   Nose: Nose normal. No mucosal edema, rhinorrhea, nose lacerations, sinus tenderness or nasal deformity. Mouth/Throat: Oropharynx is clear and moist and mucous membranes are normal. No oropharyngeal exudate, posterior oropharyngeal edema or posterior oropharyngeal erythema. Eyes: Pupils are equal, round, and reactive to light. Conjunctivae and lids are normal.   Neck: Neck supple. No tracheal deviation present. No thyroid mass and no thyromegaly present. Cardiovascular: Normal rate, regular rhythm, S1 normal, S2 normal, normal heart sounds and intact distal pulses. Exam reveals no gallop and no friction rub. No murmur heard. Pulses:       Dorsalis pedis pulses are 2+ on the right side, and 2+ on the left side. Posterior tibial pulses are 2+ on the right side, and 2+ on the left side. No carotid bruits   Pulmonary/Chest: Effort normal and breath sounds normal. No respiratory distress. She has no wheezes. She has no rales. Abdominal: Soft.  Normal appearance and normal aorta. She exhibits no distension and no mass. There is no tenderness. Musculoskeletal: She exhibits no edema. Right lower leg: She exhibits no edema. Left lower leg: She exhibits no edema. Right foot: There is normal range of motion and no deformity. Left foot: There is normal range of motion and no deformity. Feet:   Right Foot:   Protective Sensation: 5 sites tested. 5 sites sensed. Skin Integrity: Negative for ulcer, erythema, callus or dry skin. Left Foot:   Protective Sensation: 5 sites tested. 5 sites sensed. Skin Integrity: Negative for ulcer, erythema, callus or dry skin. Lymphadenopathy:        Right cervical: No superficial cervical, no deep cervical and no posterior cervical adenopathy present. Left cervical: No superficial cervical, no deep cervical and no posterior cervical adenopathy present. Neurological: She is alert and oriented to person, place, and time. Intact monofilament test both feet     Skin: Skin is warm, dry and intact. Psychiatric: She has a normal mood and affect. Her speech is normal and behavior is normal. Judgment and thought content normal. She is not actively hallucinating. She is attentive. Nursing note and vitals reviewed. Vitals:    06/13/19 1345   BP: 110/72   Site: Left Upper Arm   Position: Sitting   Cuff Size: Large Adult   Pulse: 61   Weight: 206 lb 12.8 oz (93.8 kg)   Height: 5' 3\" (1.6 m)     Body mass index is 36.63 kg/m². Wt Readings from Last 3 Encounters:   06/13/19 206 lb 12.8 oz (93.8 kg)   04/24/19 204 lb 6.4 oz (92.7 kg)   04/22/19 204 lb (92.5 kg)     BP Readings from Last 3 Encounters:   06/13/19 110/72   04/24/19 132/84   04/22/19 (!) 144/76          No results found for this visit on 06/13/19. The ASCVD Risk score (Anne Greco., et al., 2013) failed to calculate for the following reasons:     The 2013 ASCVD risk score is only valid for ages 36 to 78  Lab Review   Office Visit on 04/22/2019 Component Date Value    Microalb, Ur 04/22/2019 80 mg/L     Creatinine Ur POCT 04/22/2019 200 mg/dL     Microalbumin Creatinine * 04/22/2019  mg/g    Hospital Outpatient Visit on 04/16/2019   Component Date Value    WBC 04/16/2019 8.1     RBC 04/16/2019 4.73     Hemoglobin 04/16/2019 13.3     Hematocrit 04/16/2019 43.5     MCV 04/16/2019 92.0     MCH 04/16/2019 28.1     MCHC 04/16/2019 30.6*    RDW 04/16/2019 12.9     Platelets 81/54/5481 387     MPV 04/16/2019 10.2     Differential Type 04/16/2019 AUTOMATED DIFFERENTIAL     Segs Relative 04/16/2019 44.6     Lymphocytes % 04/16/2019 37.3     Monocytes % 04/16/2019 8.1*    Eosinophils % 04/16/2019 8.6*    Basophils % 04/16/2019 1.0     Segs Absolute 04/16/2019 3.6     Lymphocytes # 04/16/2019 3.0     Monocytes # 04/16/2019 0.7     Eosinophils # 04/16/2019 0.7     Basophils # 04/16/2019 0.1     Nucleated RBC % 04/16/2019 0.0     Total Nucleated RBC 04/16/2019 0.0     Total Immature Neutrophil 04/16/2019 0.03     Immature Neutrophil % 04/16/2019 0.4     Sodium 04/16/2019 143     Potassium 04/16/2019 4.4     Chloride 04/16/2019 105     CO2 04/16/2019 23     BUN 04/16/2019 8     CREATININE 04/16/2019 0.6     Glucose, Fasting 04/16/2019 159*    Calcium 04/16/2019 9.2     Alb 04/16/2019 4.1     Total Protein 04/16/2019 6.9     Total Bilirubin 04/16/2019 0.2     ALT 04/16/2019 19     AST 04/16/2019 25     Alkaline Phosphatase 04/16/2019 75     GFR Non- 04/16/2019 >60     GFR  04/16/2019 >60     Anion Gap 04/16/2019 15     HIV Screen 04/16/2019 NON REACTIVE     Triglycerides 04/16/2019 228*    Cholesterol 04/16/2019 300*    HDL 04/16/2019 70     LDL Direct 04/16/2019 221*   Office Visit on 04/08/2019   Component Date Value    Hemoglobin A1C 04/08/2019 9.8            Assessment:       Diagnosis Orders   1.  Type 2 diabetes mellitus with microalbuminuria, without long-term current use of insulin (HCC)  HM DIABETES FOOT EXAM    metFORMIN (GLUCOPHAGE-XR) 500 MG extended release tablet    lisinopril (PRINIVIL;ZESTRIL) 2.5 MG tablet   2. Exposure to second hand tobacco smoke     3. Severe obesity with body mass index (BMI) of 35.0 to 39.9 with serious comorbidity ([facility]     4. Mixed hyperlipidemia     5. Need for hepatitis B vaccination  Hep B Vaccine Adult (ENGERIX-B)   6. Herpes simplex vulvovaginitis     7. Environmental allergies  fluticasone (FLONASE) 50 MCG/ACT nasal spray           Plan:      See orders    Explained that she may not need lifetime treatment with Valtrex. Recommend she put the Valtrex to the side and see how she does without it. Explained that she is more contagious if she has an outbreak. She can message me if she has another outbreak. 30 minutes of walking per day or 2 1/2 hours per week of walking or 10,000 steps   OK to take Zyrtec. Re-check in 3 months.

## 2019-06-17 RX ORDER — NORGESTIMATE AND ETHINYL ESTRADIOL 0.25-0.035
1 KIT ORAL DAILY
Qty: 3 PACKET | Refills: 3 | Status: SHIPPED | OUTPATIENT
Start: 2019-06-17 | End: 2020-03-26 | Stop reason: SDUPTHER

## 2019-06-17 RX ORDER — NORGESTIMATE AND ETHINYL ESTRADIOL 0.25-0.035
1 KIT ORAL DAILY
Qty: 1 PACKET | Refills: 11 | Status: SHIPPED | OUTPATIENT
Start: 2019-06-17 | End: 2019-06-17 | Stop reason: SDUPTHER

## 2019-10-02 ENCOUNTER — OFFICE VISIT (OUTPATIENT)
Dept: FAMILY MEDICINE CLINIC | Age: 34
End: 2019-10-02
Payer: COMMERCIAL

## 2019-10-02 VITALS
BODY MASS INDEX: 35.72 KG/M2 | WEIGHT: 201.6 LBS | TEMPERATURE: 98.5 F | SYSTOLIC BLOOD PRESSURE: 110 MMHG | DIASTOLIC BLOOD PRESSURE: 70 MMHG | HEIGHT: 63 IN | HEART RATE: 105 BPM

## 2019-10-02 DIAGNOSIS — J01.00 ACUTE MAXILLARY SINUSITIS, RECURRENCE NOT SPECIFIED: ICD-10-CM

## 2019-10-02 DIAGNOSIS — E11.29 TYPE 2 DIABETES MELLITUS WITH MICROALBUMINURIA, WITHOUT LONG-TERM CURRENT USE OF INSULIN (HCC): Primary | ICD-10-CM

## 2019-10-02 DIAGNOSIS — Z91.09 ENVIRONMENTAL ALLERGIES: ICD-10-CM

## 2019-10-02 DIAGNOSIS — E66.01 SEVERE OBESITY WITH BODY MASS INDEX (BMI) OF 35.0 TO 39.9 WITH SERIOUS COMORBIDITY (HCC): ICD-10-CM

## 2019-10-02 DIAGNOSIS — E78.2 MIXED HYPERLIPIDEMIA: ICD-10-CM

## 2019-10-02 DIAGNOSIS — Z23 NEED FOR HEPATITIS B VACCINATION: ICD-10-CM

## 2019-10-02 DIAGNOSIS — R80.9 TYPE 2 DIABETES MELLITUS WITH MICROALBUMINURIA, WITHOUT LONG-TERM CURRENT USE OF INSULIN (HCC): Primary | ICD-10-CM

## 2019-10-02 LAB — HBA1C MFR BLD: 8.6 %

## 2019-10-02 PROCEDURE — 90471 IMMUNIZATION ADMIN: CPT | Performed by: FAMILY MEDICINE

## 2019-10-02 PROCEDURE — 90746 HEPB VACCINE 3 DOSE ADULT IM: CPT | Performed by: FAMILY MEDICINE

## 2019-10-02 PROCEDURE — 83036 HEMOGLOBIN GLYCOSYLATED A1C: CPT | Performed by: FAMILY MEDICINE

## 2019-10-02 PROCEDURE — 99214 OFFICE O/P EST MOD 30 MIN: CPT | Performed by: FAMILY MEDICINE

## 2019-10-02 RX ORDER — AMOXICILLIN 875 MG/1
875 TABLET, COATED ORAL 2 TIMES DAILY
Qty: 20 TABLET | Refills: 0 | Status: SHIPPED | OUTPATIENT
Start: 2019-10-02 | End: 2019-10-12

## 2019-10-02 RX ORDER — METFORMIN HYDROCHLORIDE 500 MG/1
1000 TABLET, EXTENDED RELEASE ORAL
Qty: 180 TABLET | Refills: 0 | Status: SHIPPED | OUTPATIENT
Start: 2019-10-02 | End: 2019-10-14 | Stop reason: SDUPTHER

## 2019-10-02 RX ORDER — LISINOPRIL 2.5 MG/1
2.5 TABLET ORAL DAILY
Qty: 90 TABLET | Refills: 0 | Status: SHIPPED | OUTPATIENT
Start: 2019-10-02 | End: 2020-01-02 | Stop reason: SDUPTHER

## 2019-10-02 RX ORDER — FLUTICASONE PROPIONATE 50 MCG
1 SPRAY, SUSPENSION (ML) NASAL DAILY
Qty: 1 BOTTLE | Refills: 2 | Status: SHIPPED | OUTPATIENT
Start: 2019-10-02 | End: 2020-01-02 | Stop reason: SDUPTHER

## 2019-10-02 RX ORDER — METFORMIN HYDROCHLORIDE 500 MG/1
1000 TABLET, EXTENDED RELEASE ORAL
Qty: 360 TABLET | Refills: 0 | Status: SHIPPED | OUTPATIENT
Start: 2019-10-02 | End: 2020-01-02 | Stop reason: SDUPTHER

## 2019-10-02 RX ORDER — IPRATROPIUM BROMIDE 42 UG/1
2 SPRAY, METERED NASAL 3 TIMES DAILY
Qty: 1 BOTTLE | Refills: 3 | Status: SHIPPED | OUTPATIENT
Start: 2019-10-02 | End: 2020-08-18

## 2019-10-02 ASSESSMENT — ENCOUNTER SYMPTOMS
SHORTNESS OF BREATH: 0
CHEST TIGHTNESS: 0
SINUS COMPLAINT: 1

## 2019-10-14 ENCOUNTER — OFFICE VISIT (OUTPATIENT)
Dept: FAMILY MEDICINE CLINIC | Age: 34
End: 2019-10-14
Payer: COMMERCIAL

## 2019-10-14 VITALS
BODY MASS INDEX: 36.17 KG/M2 | TEMPERATURE: 98.4 F | DIASTOLIC BLOOD PRESSURE: 90 MMHG | SYSTOLIC BLOOD PRESSURE: 158 MMHG | WEIGHT: 204.2 LBS | HEART RATE: 105 BPM | OXYGEN SATURATION: 97 %

## 2019-10-14 DIAGNOSIS — B35.9 TINEA: Primary | ICD-10-CM

## 2019-10-14 PROCEDURE — 99213 OFFICE O/P EST LOW 20 MIN: CPT | Performed by: NURSE PRACTITIONER

## 2019-10-14 RX ORDER — CLOTRIMAZOLE 1 %
CREAM (GRAM) TOPICAL
Qty: 1 TUBE | Refills: 0 | Status: SHIPPED | OUTPATIENT
Start: 2019-10-14 | End: 2019-10-21

## 2019-10-17 ASSESSMENT — ENCOUNTER SYMPTOMS: RESPIRATORY NEGATIVE: 1

## 2020-01-02 ENCOUNTER — OFFICE VISIT (OUTPATIENT)
Dept: FAMILY MEDICINE CLINIC | Age: 35
End: 2020-01-02
Payer: COMMERCIAL

## 2020-01-02 VITALS
HEART RATE: 104 BPM | OXYGEN SATURATION: 98 % | HEIGHT: 63 IN | DIASTOLIC BLOOD PRESSURE: 78 MMHG | SYSTOLIC BLOOD PRESSURE: 132 MMHG | WEIGHT: 195.8 LBS | BODY MASS INDEX: 34.69 KG/M2

## 2020-01-02 LAB — HBA1C MFR BLD: 8.1 %

## 2020-01-02 PROCEDURE — 99214 OFFICE O/P EST MOD 30 MIN: CPT | Performed by: FAMILY MEDICINE

## 2020-01-02 PROCEDURE — 90732 PPSV23 VACC 2 YRS+ SUBQ/IM: CPT | Performed by: FAMILY MEDICINE

## 2020-01-02 PROCEDURE — 90471 IMMUNIZATION ADMIN: CPT | Performed by: FAMILY MEDICINE

## 2020-01-02 PROCEDURE — 83036 HEMOGLOBIN GLYCOSYLATED A1C: CPT | Performed by: FAMILY MEDICINE

## 2020-01-02 RX ORDER — FLUTICASONE PROPIONATE 50 MCG
1 SPRAY, SUSPENSION (ML) NASAL DAILY
Qty: 1 BOTTLE | Refills: 2 | Status: SHIPPED | OUTPATIENT
Start: 2020-01-02 | End: 2020-03-26 | Stop reason: SDUPTHER

## 2020-01-02 RX ORDER — METFORMIN HYDROCHLORIDE 500 MG/1
1000 TABLET, EXTENDED RELEASE ORAL
Qty: 360 TABLET | Refills: 0 | Status: SHIPPED | OUTPATIENT
Start: 2020-01-02 | End: 2020-03-26 | Stop reason: SDUPTHER

## 2020-01-02 RX ORDER — GLIMEPIRIDE 2 MG/1
2 TABLET ORAL EVERY MORNING
Qty: 90 TABLET | Refills: 3 | Status: SHIPPED | OUTPATIENT
Start: 2020-01-02 | End: 2021-01-07 | Stop reason: SDUPTHER

## 2020-01-02 RX ORDER — LISINOPRIL 2.5 MG/1
2.5 TABLET ORAL DAILY
Qty: 90 TABLET | Refills: 0 | Status: SHIPPED | OUTPATIENT
Start: 2020-01-02 | End: 2020-03-26

## 2020-01-02 ASSESSMENT — PATIENT HEALTH QUESTIONNAIRE - PHQ9
2. FEELING DOWN, DEPRESSED OR HOPELESS: 1
SUM OF ALL RESPONSES TO PHQ QUESTIONS 1-9: 2
SUM OF ALL RESPONSES TO PHQ QUESTIONS 1-9: 2
SUM OF ALL RESPONSES TO PHQ9 QUESTIONS 1 & 2: 2
1. LITTLE INTEREST OR PLEASURE IN DOING THINGS: 1

## 2020-01-02 ASSESSMENT — ENCOUNTER SYMPTOMS: CHEST TIGHTNESS: 0

## 2020-01-02 NOTE — PROGRESS NOTES
Maternal Grandmother        Current Outpatient Medications on File Prior to Visit   Medication Sig Dispense Refill    ipratropium (ATROVENT) 0.06 % nasal spray 2 sprays by Nasal route 3 times daily 1 Bottle 3    norgestimate-ethinyl estradiol (SPRINTEC 28) 0.25-35 MG-MCG per tablet Take 1 tablet by mouth daily 3 packet 3    acyclovir (ZOVIRAX) 400 MG tablet Take 400 mg by mouth daily       cetirizine (ZYRTEC ALLERGY) 10 MG tablet Take 10 mg by mouth daily      glucose monitoring kit (FREESTYLE) monitoring kit 1 kit by Does not apply route daily Please dispense what insurance pays for. 1 kit 0    Lancets MISC 1 each by Does not apply route 2 times daily 100 each 5    Alcohol Swabs (ALCOHOL PADS) 70 % PADS 1 applicator by Does not apply route daily 100 each 1    blood glucose test strips (EXACTECH TEST) strip Blood sugar check daily Please dispense what insurance pays for. 100 each 3     No current facility-administered medications on file prior to visit. Objective:         Physical Exam  Vitals signs and nursing note reviewed. Constitutional:       General: She is not in acute distress. Appearance: She is well-developed. She is obese. HENT:      Head: Normocephalic. Neck:      Thyroid: No thyromegaly. Trachea: No tracheal deviation. Cardiovascular:      Rate and Rhythm: Normal rate and regular rhythm. Pulses:           Dorsalis pedis pulses are 2+ on the right side and 2+ on the left side. Posterior tibial pulses are 2+ on the right side and 2+ on the left side. Heart sounds: Normal heart sounds, S1 normal and S2 normal. No murmur. No friction rub. No gallop. Comments: No carotid bruits  Pulmonary:      Effort: Pulmonary effort is normal. No respiratory distress. Breath sounds: Normal breath sounds. No wheezing or rales. Abdominal:      General: There is no distension. Palpations: Abdomen is soft. There is no mass. Tenderness:  There is no tenderness. Musculoskeletal:      Right foot: Normal range of motion. No deformity. Left foot: Normal range of motion. No deformity. Feet:      Right foot:      Protective Sensation: 5 sites tested. 5 sites sensed. Skin integrity: Skin integrity normal. No ulcer, erythema, callus or dry skin. Toenail Condition: Right toenails are normal.      Left foot:      Protective Sensation: 5 sites tested. 5 sites sensed. Skin integrity: Skin integrity normal. No ulcer, erythema, callus or dry skin. Toenail Condition: Left toenails are normal.   Skin:     General: Skin is warm and dry. Neurological:      Mental Status: She is alert and oriented to person, place, and time. Comments:      Psychiatric:         Mood and Affect: Mood is depressed. Vitals:    01/02/20 0918   BP: 132/78   Site: Left Upper Arm   Position: Sitting   Cuff Size: Large Adult   Pulse: 104   SpO2: 98%   Weight: 195 lb 12.8 oz (88.8 kg)   Height: 5' 2.99\" (1.6 m)     Body mass index is 34.69 kg/m². Wt Readings from Last 3 Encounters:   01/02/20 195 lb 12.8 oz (88.8 kg)   10/14/19 204 lb 3.2 oz (92.6 kg)   10/02/19 201 lb 9.6 oz (91.4 kg)     BP Readings from Last 3 Encounters:   01/02/20 132/78   10/14/19 (!) 158/90   10/02/19 110/70          Results for orders placed or performed in visit on 01/02/20   POCT glycosylated hemoglobin (Hb A1C)   Result Value Ref Range    Hemoglobin A1C 8.1 %     The ASCVD Risk score (Martina Medico., et al., 2013) failed to calculate for the following reasons: The 2013 ASCVD risk score is only valid for ages 36 to 78  Lab Review   Office Visit on 10/02/2019   Component Date Value    Hemoglobin A1C 10/02/2019 8.6            Assessment:       Diagnosis Orders   1.  Type 2 diabetes mellitus with microalbuminuria, without long-term current use of insulin (HCC)  POCT glycosylated hemoglobin (Hb A1C)    glimepiride (AMARYL) 2 MG tablet    metFORMIN (GLUCOPHAGE-XR) 500 MG extended

## 2020-03-26 ENCOUNTER — OFFICE VISIT (OUTPATIENT)
Dept: FAMILY MEDICINE CLINIC | Age: 35
End: 2020-03-26
Payer: COMMERCIAL

## 2020-03-26 VITALS
SYSTOLIC BLOOD PRESSURE: 150 MMHG | WEIGHT: 202.4 LBS | BODY MASS INDEX: 35.86 KG/M2 | DIASTOLIC BLOOD PRESSURE: 80 MMHG | HEART RATE: 108 BPM

## 2020-03-26 LAB — HBA1C MFR BLD: 6.6 %

## 2020-03-26 PROCEDURE — 83036 HEMOGLOBIN GLYCOSYLATED A1C: CPT | Performed by: FAMILY MEDICINE

## 2020-03-26 PROCEDURE — 99214 OFFICE O/P EST MOD 30 MIN: CPT | Performed by: FAMILY MEDICINE

## 2020-03-26 RX ORDER — LISINOPRIL 10 MG/1
10 TABLET ORAL DAILY
Qty: 90 TABLET | Refills: 0 | Status: SHIPPED | OUTPATIENT
Start: 2020-03-26 | End: 2020-06-30 | Stop reason: SDUPTHER

## 2020-03-26 RX ORDER — NORGESTIMATE AND ETHINYL ESTRADIOL 0.25-0.035
1 KIT ORAL DAILY
Qty: 3 PACKET | Refills: 1 | Status: SHIPPED | OUTPATIENT
Start: 2020-03-26 | End: 2020-08-18 | Stop reason: SDUPTHER

## 2020-03-26 RX ORDER — FLUTICASONE PROPIONATE 50 MCG
1 SPRAY, SUSPENSION (ML) NASAL DAILY
Qty: 1 BOTTLE | Refills: 2 | Status: SHIPPED | OUTPATIENT
Start: 2020-03-26

## 2020-03-26 RX ORDER — METFORMIN HYDROCHLORIDE 500 MG/1
1000 TABLET, EXTENDED RELEASE ORAL
Qty: 360 TABLET | Refills: 0 | Status: SHIPPED | OUTPATIENT
Start: 2020-03-26 | End: 2020-06-30 | Stop reason: SDUPTHER

## 2020-03-26 ASSESSMENT — ENCOUNTER SYMPTOMS
SINUS COMPLAINT: 1
SHORTNESS OF BREATH: 0
COUGH: 1
CHEST TIGHTNESS: 0

## 2020-03-26 NOTE — PATIENT INSTRUCTIONS
PLEASE BRING YOUR MEDICATIONS TO ALL APPOINTMENTS    The diagnoses and medications listed in this after visit summary may not be accurate at the time of check out. Please check MY CHART in 28-48 hours for possible corrections. Late cancellation policy: So that we can better accommodate people who are sick, please give our office 24 hour notice for an appointment cancellation. Thank you. Missed appointments: Your care is very important to us. It is important that you keep your scheduled appointments. Multiple missed appointments may lead to a dismissal from the office. Later arrival policy: If you are more than 10 minutes late for your appointment, you will be asked to reschedule. Please allow 5-7 business days for paperwork to be processed. It is important that you check your MY Chart messages, as they include appointment reminders, test results, and other important information. If you have forgotten your password, please call [phone].

## 2020-03-26 NOTE — PROGRESS NOTES
Relation Age of Onset    Diabetes Mother     Heart Failure Mother     High Cholesterol Mother     High Blood Pressure Mother     Cancer Father     Rheum Arthritis Sister     Cervical Cancer Sister     Cancer Brother         throat    Colon Cancer Brother     High Cholesterol Maternal Grandmother     High Blood Pressure Maternal Grandmother        Current Outpatient Medications on File Prior to Visit   Medication Sig Dispense Refill    glimepiride (AMARYL) 2 MG tablet Take 1 tablet by mouth every morning 90 tablet 3    cetirizine (ZYRTEC ALLERGY) 10 MG tablet Take 10 mg by mouth daily      ipratropium (ATROVENT) 0.06 % nasal spray 2 sprays by Nasal route 3 times daily (Patient not taking: Reported on 3/26/2020) 1 Bottle 3    acyclovir (ZOVIRAX) 400 MG tablet Take 400 mg by mouth daily       glucose monitoring kit (FREESTYLE) monitoring kit 1 kit by Does not apply route daily Please dispense what insurance pays for. (Patient not taking: Reported on 3/26/2020) 1 kit 0    Lancets MISC 1 each by Does not apply route 2 times daily (Patient not taking: Reported on 3/26/2020) 100 each 5    Alcohol Swabs (ALCOHOL PADS) 70 % PADS 1 applicator by Does not apply route daily (Patient not taking: Reported on 3/26/2020) 100 each 1    blood glucose test strips (EXACTECH TEST) strip Blood sugar check daily Please dispense what insurance pays for. (Patient not taking: Reported on 3/26/2020) 100 each 3     No current facility-administered medications on file prior to visit. Objective:         Physical Exam  Vitals signs and nursing note reviewed. Constitutional:       General: She is not in acute distress. Appearance: She is well-developed. HENT:      Head: Normocephalic and atraumatic.       Right Ear: Hearing, tympanic membrane and external ear normal.      Left Ear: Hearing, tympanic membrane and external ear normal.      Nose: Nose normal. No nasal deformity, laceration, mucosal edema

## 2020-04-08 RX ORDER — ACYCLOVIR 400 MG/1
400 TABLET ORAL DAILY
Qty: 90 TABLET | Refills: 3 | Status: SHIPPED | OUTPATIENT
Start: 2020-04-08 | End: 2021-04-29 | Stop reason: SDUPTHER

## 2020-06-25 ENCOUNTER — NURSE TRIAGE (OUTPATIENT)
Dept: OTHER | Facility: CLINIC | Age: 35
End: 2020-06-25

## 2020-06-25 NOTE — TELEPHONE ENCOUNTER
Reason for Disposition   [1] Caller concerned that exposure to COVID-19 occurred BUT [2] does not meet COVID-19 EXPOSURE criteria from [facility]    Answer Assessment - Initial Assessment Questions  1. CLOSE CONTACT: \"Who is the person with the confirmed or suspected COVID-19 infection that you were exposed to? \"      unsure  2. PLACE of CONTACT: \"Where were you when you were exposed to COVID-19? \" (e.g., home, school, medical waiting room; which city?)      unsure  3. TYPE of CONTACT: \"How much contact was there? \" (e.g., sitting next to, live in same house, work in same office, same building)      unsure  4. DURATION of CONTACT: \"How long were you in contact with the COVID-19 patient? \" (e.g., a few seconds, passed by person, a few minutes, live with the patient)      unsure  5. DATE of CONTACT: \"When did you have contact with a COVID-19 patient? \" (e.g., how many days ago)      unsure  6. TRAVEL: \"Have you traveled out of the country recently? \" If so, \"When and where? \"      * Also ask about out-of-state travel, since the CDC has identified some high-risk cities for community spread in the [address] [place] [address],[address]. * Note: Travel becomes less relevant if there is widespread community transmission where the patient lives. no  7. COMMUNITY SPREAD: \"Are there lots of cases of COVID-19 (community spread) where you live? \" (See public health department website, if unsure)        minor  8. SYMPTOMS: \"Do you have any symptoms? \" (e.g., fever, cough, breathing difficulty)      Sore throat, diarrhea and runny nose congested  9. PREGNANCY OR POSTPARTUM: \"Is there any chance you are pregnant? \" \"When was your last menstrual period? \" \"Did you deliver in the last 2 weeks? \"      6/9/2020  10. HIGH RISK: \"Do you have any heart or lung problems? Do you have a weak immune system? \" (e.g., CHF, COPD, asthma, HIV positive, chemotherapy, renal failure, diabetes mellitus, sickle cell anemia)        Diabetes    Protocols used: CORONAVIRUS (COVID-19)

## 2020-06-26 ENCOUNTER — HOSPITAL ENCOUNTER (OUTPATIENT)
Age: 35
Setting detail: SPECIMEN
Discharge: HOME | End: 2020-06-26
Payer: COMMERCIAL

## 2020-06-26 ENCOUNTER — OFFICE VISIT (OUTPATIENT)
Dept: PRIMARY CARE CLINIC | Age: 35
End: 2020-06-26
Payer: COMMERCIAL

## 2020-06-26 VITALS — TEMPERATURE: 96.6 F | HEART RATE: 107 BPM | OXYGEN SATURATION: 98 %

## 2020-06-26 PROCEDURE — 99211 OFF/OP EST MAY X REQ PHY/QHP: CPT | Performed by: FAMILY MEDICINE

## 2020-06-26 PROCEDURE — U0002 COVID-19 LAB TEST NON-CDC: HCPCS

## 2020-06-26 NOTE — PROGRESS NOTES
Pt presents for Covid 19 testing, due to possible exposure. Pt is denies SX. Exposed to cousin, who is employed/exposed at [employer] in [facility].

## 2020-06-28 LAB
SARS-COV-2: NOT DETECTED
SOURCE: NORMAL

## 2020-06-30 RX ORDER — METFORMIN HYDROCHLORIDE 500 MG/1
1000 TABLET, EXTENDED RELEASE ORAL
Qty: 360 TABLET | Refills: 0 | Status: SHIPPED | OUTPATIENT
Start: 2020-06-30 | End: 2020-08-18 | Stop reason: SDUPTHER

## 2020-06-30 RX ORDER — LISINOPRIL 10 MG/1
10 TABLET ORAL DAILY
Qty: 90 TABLET | Refills: 0 | Status: SHIPPED | OUTPATIENT
Start: 2020-06-30 | End: 2020-08-18 | Stop reason: SDUPTHER

## 2020-06-30 RX ORDER — LISINOPRIL 10 MG/1
TABLET ORAL
Qty: 90 TABLET | Refills: 0 | OUTPATIENT
Start: 2020-06-30

## 2020-08-18 ENCOUNTER — OFFICE VISIT (OUTPATIENT)
Dept: FAMILY MEDICINE CLINIC | Age: 35
End: 2020-08-18
Payer: COMMERCIAL

## 2020-08-18 VITALS
BODY MASS INDEX: 36.08 KG/M2 | TEMPERATURE: 98.1 F | OXYGEN SATURATION: 99 % | HEART RATE: 70 BPM | SYSTOLIC BLOOD PRESSURE: 128 MMHG | WEIGHT: 203.6 LBS | DIASTOLIC BLOOD PRESSURE: 74 MMHG

## 2020-08-18 LAB
A/G RATIO: 1.3 (ref 1.1–2.2)
ALBUMIN SERPL-MCNC: 4.3 G/DL (ref 3.4–5)
ALP BLD-CCNC: 72 U/L (ref 40–129)
ALT SERPL-CCNC: 25 U/L (ref 10–40)
ANION GAP SERPL CALCULATED.3IONS-SCNC: 18 MMOL/L (ref 3–16)
AST SERPL-CCNC: 26 U/L (ref 15–37)
BILIRUB SERPL-MCNC: 0.3 MG/DL (ref 0–1)
BUN BLDV-MCNC: 11 MG/DL (ref 7–20)
CALCIUM SERPL-MCNC: 10 MG/DL (ref 8.3–10.6)
CHLORIDE BLD-SCNC: 99 MMOL/L (ref 99–110)
CHOLESTEROL, TOTAL: 303 MG/DL (ref 0–199)
CO2: 19 MMOL/L (ref 21–32)
CREAT SERPL-MCNC: 0.6 MG/DL (ref 0.6–1.1)
CREATININE URINE: 120.6 MG/DL (ref 28–259)
GFR AFRICAN AMERICAN: >60
GFR NON-AFRICAN AMERICAN: >60
GLOBULIN: 3.3 G/DL
GLUCOSE BLD-MCNC: 202 MG/DL (ref 70–99)
HDLC SERPL-MCNC: 72 MG/DL (ref 40–60)
LDL CHOLESTEROL CALCULATED: 186 MG/DL
MICROALBUMIN UR-MCNC: 9.2 MG/DL
MICROALBUMIN/CREAT UR-RTO: 76.3 MG/G (ref 0–30)
POTASSIUM SERPL-SCNC: 4.6 MMOL/L (ref 3.5–5.1)
SODIUM BLD-SCNC: 136 MMOL/L (ref 136–145)
TOTAL PROTEIN: 7.6 G/DL (ref 6.4–8.2)
TRIGL SERPL-MCNC: 225 MG/DL (ref 0–150)
VLDLC SERPL CALC-MCNC: 45 MG/DL

## 2020-08-18 PROCEDURE — 36415 COLL VENOUS BLD VENIPUNCTURE: CPT | Performed by: FAMILY MEDICINE

## 2020-08-18 PROCEDURE — 90471 IMMUNIZATION ADMIN: CPT | Performed by: FAMILY MEDICINE

## 2020-08-18 PROCEDURE — 99395 PREV VISIT EST AGE 18-39: CPT | Performed by: FAMILY MEDICINE

## 2020-08-18 PROCEDURE — 90715 TDAP VACCINE 7 YRS/> IM: CPT | Performed by: FAMILY MEDICINE

## 2020-08-18 PROCEDURE — 90746 HEPB VACCINE 3 DOSE ADULT IM: CPT | Performed by: FAMILY MEDICINE

## 2020-08-18 PROCEDURE — 99214 OFFICE O/P EST MOD 30 MIN: CPT | Performed by: FAMILY MEDICINE

## 2020-08-18 PROCEDURE — 90472 IMMUNIZATION ADMIN EACH ADD: CPT | Performed by: FAMILY MEDICINE

## 2020-08-18 RX ORDER — LISINOPRIL 10 MG/1
10 TABLET ORAL DAILY
Qty: 90 TABLET | Refills: 0 | Status: SHIPPED | OUTPATIENT
Start: 2020-08-18 | End: 2021-01-07 | Stop reason: SDUPTHER

## 2020-08-18 RX ORDER — METFORMIN HYDROCHLORIDE 500 MG/1
1000 TABLET, EXTENDED RELEASE ORAL
Qty: 360 TABLET | Refills: 0 | Status: SHIPPED | OUTPATIENT
Start: 2020-08-18 | End: 2021-04-29 | Stop reason: SDUPTHER

## 2020-08-18 RX ORDER — NORGESTIMATE AND ETHINYL ESTRADIOL 0.25-0.035
1 KIT ORAL DAILY
Qty: 3 PACKET | Refills: 4 | Status: SHIPPED | OUTPATIENT
Start: 2020-08-18 | End: 2021-03-24 | Stop reason: SDUPTHER

## 2020-08-18 NOTE — PROGRESS NOTES
Wilma Lua  YOB: 1985    Date of Service:  8/18/2020    Chief Complaint:   Wilma Lua is a 38 y.o. female who presents for pap smear  HPI: Here for pap      Also here for diabetes: She is on metformin. She has not been checking her sugars. She has been gaining weight. She recently however had lost some weight as well. She was a little bit depressed and so was not eating and checking her sugars as she should    Hypertension: Taking her lisinopril as directed. Risk factors include diabetes and obesity.       Wt Readings from Last 3 Encounters:   08/18/20 203 lb 9.6 oz (92.4 kg)   03/26/20 202 lb 6.4 oz (91.8 kg)   01/02/20 195 lb 12.8 oz (88.8 kg)     BP Readings from Last 3 Encounters:   08/18/20 128/74   03/26/20 (!) 150/80   01/02/20 132/78       Patient Active Problem List   Diagnosis    Type 2 diabetes mellitus without complication, without long-term current use of insulin ([address]    Mixed hyperlipidemia    Exposure to second hand tobacco smoke    Severe obesity with body mass index (BMI) of 35.0 to 39.9 with serious comorbidity ([address]       Preventive Care:  Health Maintenance   Topic Date Due    Varicella vaccine (1 of 2 - 2-dose childhood series) 06/05/1985    Diabetic retinal exam  06/05/1994    DTaP/Tdap/Td vaccine (1 - Tdap) 06/05/2003    Cervical cancer screen  06/05/2005    Hepatitis B vaccine (3 of 3 - Risk 3-dose series) 02/02/2020    Lipid screen  04/16/2020    Potassium monitoring  04/16/2020    Creatinine monitoring  04/16/2020    Diabetic microalbuminuria test  04/22/2020    Diabetic foot exam  06/13/2020    Flu vaccine (1) 09/01/2020    A1C test (Diabetic or Prediabetic)  03/26/2021    Pneumococcal 0-64 years Vaccine  Completed    HIV screen  Completed    Hepatitis A vaccine  Aged Out    Hib vaccine  Aged Out    Meningococcal (ACWY) vaccine  Aged Out      Hx abnormal PAP: no  Sexual activity: single partner, contraception - OCP (estrogen/progesterone)     Exercise: occasional  Seatbelt use:Yes  Lipid panel:   Lab Results   Component Value Date    CHOL 300 (H) 04/16/2019    TRIG 228 (H) 04/16/2019    HDL 70 04/16/2019    LDLDIRECT 221 (H) 04/16/2019          Immunization History   Administered Date(s) Administered    Hepatitis B Adult (Engerix-B) 06/13/2019, 10/02/2019    Pneumococcal Polysaccharide (Eirqmtbih26) 01/02/2020       Allergies   Allergen Reactions    Lipitor [Atorvastatin Calcium]      Numbness of lips, nose, hand, mixed up speech, loss of focus    Shellfish-Derived Products      Throat swelling       Outpatient Medications Marked as Taking for the 8/18/20 encounter (Office Visit) with Margaret Jarrett MD   Medication Sig Dispense Refill    metFORMIN (GLUCOPHAGE-XR) 500 MG extended release tablet Take 2 tablets by mouth 2 times daily (before meals) 360 tablet 0    lisinopril (PRINIVIL;ZESTRIL) 10 MG tablet Take 1 tablet by mouth daily 90 tablet 0    norgestimate-ethinyl estradiol (SPRINTEC 28) 0.25-35 MG-MCG per tablet Take 1 tablet by mouth daily 3 packet 4    acyclovir (ZOVIRAX) 400 MG tablet Take 1 tablet by mouth daily 90 tablet 3    fluticasone (FLONASE) 50 MCG/ACT nasal spray 1 spray by Each Nostril route daily 1 Bottle 2    glimepiride (AMARYL) 2 MG tablet Take 1 tablet by mouth every morning 90 tablet 3    cetirizine (ZYRTEC ALLERGY) 10 MG tablet Take 10 mg by mouth daily         No past medical history on file. No past surgical history on file.   Family History   Problem Relation Age of Onset    Diabetes Mother     Heart Failure Mother     High Cholesterol Mother     High Blood Pressure Mother     Cancer Father     Rheum Arthritis Sister     Cervical Cancer Sister     Cancer Brother         throat    Colon Cancer Brother     High Cholesterol Maternal Grandmother     High Blood Pressure Maternal Grandmother      Social History     Socioeconomic History    Marital status: Single     Spouse name: Not on file    Number of children: Not on file    Years of education: Not on file    Highest education level: Not on file   Occupational History    Not on file   Social Needs    Financial resource strain: Not on file    Food insecurity     Worry: Not on file     Inability: Not on file    Transportation needs     Medical: Not on file     Non-medical: Not on file   Tobacco Use    Smoking status: Never Smoker    Smokeless tobacco: Never Used   Substance and Sexual Activity    Alcohol use: Yes     Frequency: Monthly or less    Drug use: Never    Sexual activity: Not on file   Lifestyle    Physical activity     Days per week: Not on file     Minutes per session: Not on file    Stress: Not on file   Relationships    Social connections     Talks on phone: Not on file     Gets together: Not on file     Attends [employer] service: Not on file     Active member of club or organization: Not on file     Attends meetings of clubs or organizations: Not on file     Relationship status: Not on file    Intimate partner violence     Fear of current or ex partner: Not on file     Emotionally abused: Not on file     Physically abused: Not on file     Forced sexual activity: Not on file   Other Topics Concern    Not on file   Social History Narrative    Not on file       Review of Systems:  A comprehensive review of systems was negative except for what was noted in the HPI. Physical Exam:   Vitals:    08/18/20 0911   BP: 128/74   Site: Right Upper Arm   Position: Sitting   Cuff Size: Large Adult   Pulse: 70   Temp: 98.1 °F (36.7 °C)   TempSrc: Temporal   SpO2: 99%   Weight: 203 lb 9.6 oz (92.4 kg)     Body mass index is 36.08 kg/m². Constitutional: She is oriented to person, place, and time. She appears well-developed and well-nourished. No distress. obese   HEENT:   Head: Normocephalic and atraumatic.    Right Ear: Tympanic membrane, external ear and ear canal normal.   Left Ear: Tympanic membrane, external ear and ear canal normal.  Neck: Neck supple. No JVD present. Carotid bruit is not present. No mass and no thyromegaly present. Cardiovascular: Normal rate, regular rhythm, normal heart sounds and intact distal pulses. Exam reveals no gallop and no friction rub. No murmur heard. Pulmonary/Chest: Effort normal and breath sounds normal. No respiratory distress. She has no wheezes, rhonchi or rales. Abdominal: Soft, non-tender. Bowel sounds and aorta are normal. She exhibits no organomegaly, mass or bruit. Genitourinary: normal appearing vulva with no masses, tenderness or lesions, Vagina:  normal mucosa without prolapse or lesions and Cervix:  Normal.  Breast exam:  breasts appear normal, no suspicious masses, no skin or nipple changes or axillary nodes. Musculoskeletal: Normal range of motion, no synovitis. She exhibits no edema. Neurological: She is alert and oriented to person, place, and time. She has normal reflexes. No cranial nerve deficit. Coordination normal.   Skin: Skin is warm and dry. There is no rash or erythema. No suspicious lesions noted. Psychiatric: She has a normal mood and affect. Her speech is normal and behavior is normal. Judgment, cognition and memory are normal.     Physical Exam  Cardiovascular:      Pulses:           Dorsalis pedis pulses are 2+ on the right side and 2+ on the left side. Posterior tibial pulses are 2+ on the right side and 2+ on the left side. Musculoskeletal:      Right foot: Normal range of motion. No deformity. Left foot: Normal range of motion. No deformity. Feet:      Right foot:      Protective Sensation: 5 sites tested. 5 sites sensed. Skin integrity: No ulcer, blister, skin breakdown, erythema, callus or dry skin. Left foot:      Protective Sensation: 5 sites tested. 5 sites sensed. Skin integrity: No ulcer, blister, skin breakdown, erythema, callus or dry skin. Skin:     General: Skin is warm.       Comments: No skin lesions feet   Neurological:      Comments: Intact monofilament both feet         Lab Review:   Hospital Outpatient Visit on 06/26/2020   Component Date Value    Source 06/26/2020 VIRAL SWAB     SARS-CoV-2 06/26/2020 NOT DETECTED         Assessment/Plan:    Dianne Cedillo was seen today for gynecologic exam.    Diagnoses and all orders for this visit:    Annual physical exam  -     Lipid Panel  -     Comprehensive Metabolic Panel  -     Microalbumin / Creatinine Urine Ratio  -     PAP SMEAR  -     Tdap (age 6y and older) IM (BOOSTRIX)  -     Hep B Vaccine Adult (ENGERIX-B)  -     norgestimate-ethinyl estradiol ([id number] [id number]) 0.25-35 MG-MCG per tablet; Take 1 tablet by mouth daily    Type 2 diabetes mellitus without complication, without long-term current use of insulin (HCC)  -     Lipid Panel  -     Comprehensive Metabolic Panel  -     Microalbumin / Creatinine Urine Ratio    Severe obesity with body mass index (BMI) of 35.0 to 39.9 with serious comorbidity (HCC)    Mixed hyperlipidemia  -     Lipid Panel  -     Comprehensive Metabolic Panel  -     Microalbumin / Creatinine Urine Ratio    Need for hepatitis B vaccination  -     Hep B Vaccine Adult (ENGERIX-B)    Need for tetanus booster    Type 2 diabetes mellitus with microalbuminuria, without long-term current use of insulin (HCC)  -     metFORMIN (GLUCOPHAGE-XR) 500 MG extended release tablet; Take 2 tablets by mouth 2 times daily (before meals)  -     lisinopril (PRINIVIL;ZESTRIL) 10 MG tablet; Take 1 tablet by mouth daily  -     Hemoglobin A1C  -      DIABETES FOOT EXAM    Essential hypertension  -     lisinopril (PRINIVIL;ZESTRIL) 10 MG tablet; Take 1 tablet by mouth daily      Weight loss recommended. Avoid sugary drinks and foods. Recheck in 3 months for diabetes  . Goal of 5 vegetables and fruits per day or half the plate be vegetables and fruits. 2-3 serving of dairy per day. Encourage aerobic exercise up to 150 minutes per week.      Mammogram guidelines per USPTF recommendations. Mammogram is not ordered. Patient is considered to be low for breast cancer.     Patient does not need low dose aspirin to lower her ASCVD risk    Chaperone in room: Joceline Teresa

## 2020-08-18 NOTE — PATIENT INSTRUCTIONS
October Fifth is the DEADLINE for voter registration for the November Third GENERAL ELECTION. Don't forget to vote!!!        [address] TO ALL APPOINTMENTS    The diagnoses and medications listed in this after visit summary may not be accurate at the time of check out. Please check MY CHART in 28-48 hours for possible corrections. Late cancellation policy: So that we can better accommodate people who are sick, please give our office 24 hour notice for an appointment cancellation. Thank you. Missed appointments: Your care is very important to us. It is important that you keep your scheduled appointments. Multiple missed appointments will lead to a dismissal from the office. Later arrival policy: If you are more than 10 minutes late for your appointment, you will be asked to reschedule. Please allow 5-7 business days for paperwork to be processed. It is important that you check your MY Chart messages, as they include appointment reminders, test results, and other important information. If you have forgotten your password, please call [phone]. Walking for Exercise: Care Instructions  Your Care Instructions    Walking is one of the easiest ways to get the exercise you need for good health. A brisk, 30-minute walk each day can help you feel better and have more energy. It can help you lower your risk of disease. Walking can help you keep your bones strong and your heart healthy. Check with your doctor before you start a walking plan if you have heart problems, other health issues, or you have not been active in a long time. Follow your doctor's instructions for safe levels of exercise. Follow-up care is a key part of your treatment and safety. Be sure to make and go to all appointments, and call your doctor if you are having problems. It's also a good idea to know your test results and keep a list of the medicines you take.   How can you care for yourself at home?  Getting started  · Start slowly and set a short-term goal. For example, walk for 5 or 10 minutes every day. · Bit by bit, increase the amount you walk every day. Try for at least 30 minutes on most days of the week. You also may want to swim, bike, or do other activities. · If finding enough time is a problem, it is fine to be active in blocks of 10 minutes or more throughout your day and week. · To get the heart-healthy benefits of walking, you need to walk briskly enough to increase your heart rate and breathing, but not so fast that you cannot talk comfortably. · Wear comfortable shoes that fit well and provide good support for your feet and ankles. Staying with your plan  · After you've made walking a habit, set a longer-term goal. You may want to set a goal of walking briskly for longer or walking farther. Experts say to do 2½ hours of moderate activity a week. A faster heartbeat is what defines moderate-level activity. · To stay motivated, walk with friends, coworkers, or pets. · Use a phone casie or pedometer to track your steps each day. Set a goal to increase your steps. Once you get there, set a higher goal. Aim for 10,000 steps a day. · If the weather keeps you from walking outside, go for walks at the mall with a friend. Local schools and churches may have indoor gyms where you can walk. Fitting a walk into your workday  · Park several blocks away from work, or get off the bus a few stops early. · Use the stairs instead of the elevator, at least for a few floors. · Suggest holding meetings with colleagues during a walk inside or outside the building. · Use the restroom that is the farthest from your desk or workstation. · Use your morning and afternoon breaks to take quick 15-minute walks. Staying safe  · Know your surroundings. Walk in a well-lighted, safe place. If it is dark, walk with a partner. Wear light-colored clothing.  If you can, buy a vest or jacket that reflects light.  · Carry a cell phone for emergencies. · Drink plenty of water. Take a water bottle with you when you walk. This is very important if it is hot out. · Be careful not to slip on wet or icy ground. You can buy \"grippers\" for your shoes to help keep you from slipping. · Pay attention to your walking surface. Use sidewalks and paths. · If you have breathing problems like asthma or COPD, ask your doctor when it is safe for you to walk outdoors. Cold, dry air, smog, pollen, or other things in the air could cause breathing problems. Where can you learn more? Go to https://BIME Analyticspe"[email or web address].[email or web address] org and sign in to your Watt[employer] account. Enter R159 in the PEVESA box to learn more about \"Walking for Exercise: Care Instructions. \"     If you do not have an account, please click on the \"Sign Up Now\" link. Current as of: August 19, 2018  Content Version: 12.0  © 8397-2175 Healthwise, [employer]. Care instructions adapted under license by [facility] ([facility] [place]). If you have questions about a medical condition or this instruction, always ask your healthcare professional. [address] any warranty or liability for your use of this information. Learning About Dietary Guidelines  What are the Dietary Guidelines for Americans? Dietary Guidelines for Americans provide tips for eating well and staying healthy. This helps reduce the risk for long-term (chronic) diseases. These adult guidelines from the [place] recommend that you:  · Eat lots of fruits, vegetables, whole grains, and low-fat or nonfat dairy products. · Try to balance your eating with your activity. This helps you stay at a healthy weight. · Drink alcohol in moderation, if at all. · Limit foods high in salt, saturated fat, trans fat, and added sugar. What is MyPlate? MyPlate is the U.S. government's food guide. It can help you make your own well-balanced eating plan.  A balanced eating plan means that you eat enough, but not too much, and that your food gives you the nutrients you need to stay healthy. MyPlate focuses on eating plenty of whole grains, fruits, and vegetables, and on limiting fat and sugar. It is available online at www. ChooseMyPlate.gov. How can you get started? MyPlate suggests that most adults eat certain amounts from the different food groups:  Grains  Eat 5 to 8 ounces of grains each day. Half of those should be whole grains. Choose whole-grain breads, cold and cooked cereals and grains, pasta (without creamy sauces), hard rolls, or low-fat or fat-free crackers. Vegetables  Eat 2 to 3 cups of vegetables every day. They contain little if any fat. And they have lots of nutrients that help protect against heart disease. Fruits  Eat 1½ to 2 cups of fruits every day. Fruits contain very little fat but lots of nutrients. Protein foods  Most adults need 5 to 6½ ounces each day. Choose fish and lean poultry more often. Eat red meat and fried meats less often. Dried beans, tofu, and nuts are also good sources of protein. Dairy  Most adults need 3 cups of milk and milk products a day. Choose low-fat or fat-free products from this food group. If you have problems digesting milk, try eating cheese or yogurt instead. Limit fats and oils, including those used in cooking. When you do use fats, choose oils that are liquid at room temperature (unsaturated fats). These include canola oil and olive oil. Avoid foods with trans fats, such as many fried foods, cookies, and snack foods. Where can you learn more? Go to https://giovany.99Bill. org and sign in to your [employer] account. Enter S266 in the [facility] box to learn more about \"Learning About Dietary Guidelines. \"     If you do not have an account, please click on the \"Sign Up Now\" link. Current as of: November 7, 2018  Content Version: 12.0  © 9755-7807 Healthwise, Incorporated.  Care instructions adapted under license by [address] If you have questions about a medical condition or this instruction, always ask your healthcare professional. [address] [address] any warranty or liability for your use of this information.

## 2020-08-19 LAB
ESTIMATED AVERAGE GLUCOSE: 157.1 MG/DL
HBA1C MFR BLD: 7.1 %

## 2020-08-24 ENCOUNTER — TELEPHONE (OUTPATIENT)
Dept: FAMILY MEDICINE CLINIC | Age: 35
End: 2020-08-24

## 2020-08-24 RX ORDER — ROSUVASTATIN CALCIUM 20 MG/1
20 TABLET, COATED ORAL NIGHTLY
Qty: 30 TABLET | Refills: 11 | Status: SHIPPED | OUTPATIENT
Start: 2020-08-24

## 2020-08-24 NOTE — TELEPHONE ENCOUNTER
Looks like lipitor was stopped spring 2019. What was she confused about?   I would like for her to resume taking it

## 2020-08-24 NOTE — TELEPHONE ENCOUNTER
The lipitor caused confusion, she had a reaction to Lipitor. Patient does not want to take Lipitor.  Please advise

## 2020-12-21 RX ORDER — LISINOPRIL 10 MG/1
TABLET ORAL
Qty: 90 TABLET | Refills: 0 | OUTPATIENT
Start: 2020-12-21

## 2021-01-03 DIAGNOSIS — I10 ESSENTIAL HYPERTENSION: ICD-10-CM

## 2021-01-03 DIAGNOSIS — R80.9 TYPE 2 DIABETES MELLITUS WITH MICROALBUMINURIA, WITHOUT LONG-TERM CURRENT USE OF INSULIN (HCC): ICD-10-CM

## 2021-01-03 DIAGNOSIS — E11.29 TYPE 2 DIABETES MELLITUS WITH MICROALBUMINURIA, WITHOUT LONG-TERM CURRENT USE OF INSULIN (HCC): ICD-10-CM

## 2021-01-04 RX ORDER — GLIMEPIRIDE 2 MG/1
TABLET ORAL
Qty: 90 TABLET | Refills: 3 | OUTPATIENT
Start: 2021-01-04

## 2021-01-04 RX ORDER — LISINOPRIL 10 MG/1
TABLET ORAL
Qty: 90 TABLET | Refills: 0 | OUTPATIENT
Start: 2021-01-04

## 2021-01-05 ENCOUNTER — HOSPITAL ENCOUNTER (OUTPATIENT)
Age: 36
Discharge: HOME | End: 2021-01-05
Payer: COMMERCIAL

## 2021-01-05 ENCOUNTER — TELEPHONE (OUTPATIENT)
Dept: FAMILY MEDICINE CLINIC | Age: 36
End: 2021-01-05

## 2021-01-05 LAB
ALT SERPL-CCNC: 25 U/L (ref 10–40)
AST SERPL-CCNC: 27 IU/L (ref 15–37)
CHOLESTEROL: 234 MG/DL
HDLC SERPL-MCNC: 69 MG/DL
LDL CHOLESTEROL DIRECT: 136 MG/DL
TRIGL SERPL-MCNC: 217 MG/DL

## 2021-01-05 PROCEDURE — 84460 ALANINE AMINO (ALT) (SGPT): CPT

## 2021-01-05 PROCEDURE — 80061 LIPID PANEL: CPT

## 2021-01-05 PROCEDURE — 36415 COLL VENOUS BLD VENIPUNCTURE: CPT

## 2021-01-05 PROCEDURE — 84450 TRANSFERASE (AST) (SGOT): CPT

## 2021-01-05 PROCEDURE — 83721 ASSAY OF BLOOD LIPOPROTEIN: CPT

## 2021-01-05 NOTE — TELEPHONE ENCOUNTER
Patient left a VM stating she has a appointment on 1/7/2020 with a parking lot a1c at [address] [place] [address]. Patient states she does not want to drive all the way back to [facility] to get her a1c done, she just got her labs drawn today, is there anyway we can add on the a1c so she does not have to make two trips to [facility].  Please advise

## 2021-01-07 ENCOUNTER — VIRTUAL VISIT (OUTPATIENT)
Dept: FAMILY MEDICINE CLINIC | Age: 36
End: 2021-01-07
Payer: COMMERCIAL

## 2021-01-07 DIAGNOSIS — E78.2 MIXED HYPERLIPIDEMIA: ICD-10-CM

## 2021-01-07 DIAGNOSIS — R80.9 TYPE 2 DIABETES MELLITUS WITH MICROALBUMINURIA, WITHOUT LONG-TERM CURRENT USE OF INSULIN (HCC): Primary | ICD-10-CM

## 2021-01-07 DIAGNOSIS — I10 ESSENTIAL HYPERTENSION: ICD-10-CM

## 2021-01-07 DIAGNOSIS — E66.01 SEVERE OBESITY WITH BODY MASS INDEX (BMI) OF 35.0 TO 39.9 WITH SERIOUS COMORBIDITY (HCC): ICD-10-CM

## 2021-01-07 DIAGNOSIS — E11.29 TYPE 2 DIABETES MELLITUS WITH MICROALBUMINURIA, WITHOUT LONG-TERM CURRENT USE OF INSULIN (HCC): Primary | ICD-10-CM

## 2021-01-07 LAB — HBA1C MFR BLD: 8.1 %

## 2021-01-07 PROCEDURE — 99213 OFFICE O/P EST LOW 20 MIN: CPT | Performed by: FAMILY MEDICINE

## 2021-01-07 PROCEDURE — 83036 HEMOGLOBIN GLYCOSYLATED A1C: CPT | Performed by: FAMILY MEDICINE

## 2021-01-07 PROCEDURE — 3052F HG A1C>EQUAL 8.0%<EQUAL 9.0%: CPT | Performed by: FAMILY MEDICINE

## 2021-01-07 RX ORDER — GLIMEPIRIDE 2 MG/1
2 TABLET ORAL EVERY MORNING
Qty: 90 TABLET | Refills: 0 | Status: SHIPPED | OUTPATIENT
Start: 2021-01-07 | End: 2021-04-12 | Stop reason: SDUPTHER

## 2021-01-07 RX ORDER — LISINOPRIL 10 MG/1
10 TABLET ORAL DAILY
Qty: 90 TABLET | Refills: 0 | Status: SHIPPED | OUTPATIENT
Start: 2021-01-07 | End: 2021-04-12 | Stop reason: SDUPTHER

## 2021-01-07 ASSESSMENT — ENCOUNTER SYMPTOMS
CHEST TIGHTNESS: 0
WHEEZING: 0
COUGH: 0
SHORTNESS OF BREATH: 0

## 2021-01-07 ASSESSMENT — PATIENT HEALTH QUESTIONNAIRE - PHQ9
1. LITTLE INTEREST OR PLEASURE IN DOING THINGS: 1
SUM OF ALL RESPONSES TO PHQ9 QUESTIONS 1 & 2: 2
2. FEELING DOWN, DEPRESSED OR HOPELESS: 1
SUM OF ALL RESPONSES TO PHQ QUESTIONS 1-9: 2

## 2021-01-07 NOTE — PROGRESS NOTES
2021    TELEHEALTH EVALUATION -- Audio/Visual (During [facility]- public health emergency)    HPI:    Amy Rodriguez (:  1985) has requested an audio/video evaluation for the following concern(s):    Diabetes: Did not have a fridge for several months. She admits that she was eating out a lot. She has gained some weight and knows that her sugars have gone up because of her diet. Risk factors include being obese. She is on 2 medications. She states she did not run out of her medications even though the medication list would indicate otherwise. She says she had refills left over from previous prescriptions. Hyperlipidemia: On statin medication. For compliance because of frequent eating out. Risk factors include diabetes and obesity. Review of Systems   Constitutional: Positive for activity change and unexpected weight change. Negative for chills, diaphoresis (no unusual) and fever. Respiratory: Negative for cough, chest tightness, shortness of breath and wheezing. Cardiovascular: Negative for chest pain, palpitations and leg swelling. Prior to Visit Medications    Medication Sig Taking?  Authorizing Provider   lisinopril (PRINIVIL;ZESTRIL) 10 MG tablet Take 1 tablet by mouth daily Yes Susi Calix MD   glimepiride (AMARYL) 2 MG tablet Take 1 tablet by mouth every morning Yes Susi Calix MD   rosuvastatin (CRESTOR) 20 MG tablet Take 1 tablet by mouth nightly Yes Susi Calix MD   metFORMIN (GLUCOPHAGE-XR) 500 MG extended release tablet Take 2 tablets by mouth 2 times daily (before meals) Yes Susi Calix MD   norgestimate-ethinyl estradiol ([address] [address]) 0.25-35 MG-MCG per tablet Take 1 tablet by mouth daily Yes Susi Calix MD   acyclovir (ZOVIRAX) 400 MG tablet Take 1 tablet by mouth daily Yes Susi Calix MD   fluticasone (FLONASE) 50 MCG/ACT nasal spray 1 spray by Each Nostril route daily Yes Susi Calix MD   cetirizine (ZYRTEC ALLERGY) 10 MG tablet Take 10 mg by mouth daily Yes Historical Provider, MD   glucose monitoring kit (FREESTYLE) monitoring kit 1 kit by Does not apply route daily Please dispense what insurance pays for. Patient not taking: Reported on 3/26/2020  NEHA Fields CNP   Lancets MISC 1 each by Does not apply route 2 times daily  Patient not taking: Reported on 8/18/2020  NEHA Fields CNP   Alcohol Swabs (ALCOHOL PADS) 70 % PADS 1 applicator by Does not apply route daily  Patient not taking: Reported on 3/26/2020  NEHA Fields CNP   blood glucose test strips (EXACTECH TEST) strip Blood sugar check daily Please dispense what insurance pays for. Patient not taking: Reported on 3/26/2020  NEHA Fields CNP       Social History     Tobacco Use    Smoking status: Never Smoker    Smokeless tobacco: Never Used   Substance Use Topics    Alcohol use: Yes     Frequency: Monthly or less    Drug use: Never        Allergies   Allergen Reactions    Lipitor [Atorvastatin Calcium]      Numbness of lips, nose, hand, mixed up speech, loss of focus    Shellfish-Derived Products      Throat swelling     , No past medical history on file., No past surgical history on file. PHYSICAL EXAMINATION:  [ INSTRUCTIONS:  \"[x]\" Indicates a positive item  \"[]\" Indicates a negative item  -- DELETE ALL ITEMS NOT EXAMINED]  Vital Signs: (As obtained by patient/caregiver or practitioner observation)    Blood pressure-  Heart rate-    Respiratory rate-    Temperature-  Pulse oximetry-     Constitutional: [x] Appears well-developed and well-nourished [x] No apparent distress      [] Abnormal-   Mental status  [x] Alert and awake  [x] Oriented to person/place/time [x]Able to follow commands      Eyes:  EOM    [x]  Normal  [x] Abnormal-  Sclera  []  Normal  [] Abnormal -         Discharge []  None visible  [] Abnormal -    HENT:   [x] Normocephalic, atraumatic.   [] Abnormal   [] Mouth/Throat: Mucous membranes are moist.     External Ears [x] Normal [] Abnormal-     Neck: [x] No visualized mass     Pulmonary/Chest: [x] Respiratory effort normal.  [x] No visualized signs of difficulty breathing or respiratory distress        [] Abnormal-      Musculoskeletal:   [x] Normal gait with no signs of ataxia         [x] Normal range of motion of neck        [] Abnormal-       Neurological:        [x] No Facial Asymmetry (Cranial nerve 7 motor function) (limited exam to video visit)          [x] No gaze palsy        [] Abnormal-         Skin:        [x] No significant exanthematous lesions or discoloration noted on facial skin         [] Abnormal-            Psychiatric:       [x] Normal Affect [x] No Hallucinations        [] Abnormal-     Other pertinent observable physical exam findings-     Results for orders placed or performed in visit on 01/07/21   POCT glycosylated hemoglobin (Hb A1C)   Result Value Ref Range    Hemoglobin A1C 8.1 %       Lab Review   Hospital Outpatient Visit on 01/05/2021   Component Date Value    ALT 01/05/2021 25     AST 01/05/2021 27     Triglycerides 01/05/2021 217*    Cholesterol 01/05/2021 234*    HDL 01/05/2021 69     LDL Direct 01/05/2021 136*        Diagnosis Orders   1. Type 2 diabetes mellitus with microalbuminuria, without long-term current use of insulin (HCC)  POCT glycosylated hemoglobin (Hb A1C)    lisinopril (PRINIVIL;ZESTRIL) 10 MG tablet    glimepiride (AMARYL) 2 MG tablet   2. Mixed hyperlipidemia     3. Severe obesity with body mass index (BMI) of 35.0 to 39.9 with serious comorbidity ([address]     4. Essential hypertension  lisinopril (PRINIVIL;ZESTRIL) 10 MG tablet     Diabetes uncontrolled. Will change medications today since she now has refrigerator back. We will see her back in the office in 3 months. Cholesterol much improved with medication. Will likely be much better once she improves her diet. No change to medication today. Return in about 3 months (around 4/6/2021). Keke Chakraborty is a 38 y.o. female being evaluated by a Virtual Visit (video visit) encounter to address concerns as mentioned above. A caregiver was present when appropriate. Due to this being a TeleHealth encounter (During AGXXM-50 public health emergency), evaluation of the following organ systems was limited: Vitals/Constitutional/EENT/Resp/CV/GI//MS/Neuro/Skin/Heme-Lymph-Imm. Pursuant to the emergency declaration under the [address] [place] [address], [address] [place] [address] and the [employer] Act, this Virtual Visit was conducted with patient's (and/or legal guardian's) consent, to reduce the patient's risk of exposure to COVID-19 and provide necessary medical care. The patient (and/or legal guardian) has also been advised to contact this office for worsening conditions or problems, and seek emergency medical treatment and/or call 911 if deemed necessary. Patient identification was verified at the start of the visit: Yes    Total time spent on this encounter: Not billed by time    Services were provided through a video synchronous discussion virtually to substitute for in-person clinic visit. Patient and provider were located at their individual homes. --Senthil Alvarez MD on 1/7/2021 at 5:37 PM    An electronic signature was used to authenticate this note.

## 2021-01-07 NOTE — PATIENT INSTRUCTIONS
PLEASE BRING YOUR MEDICATIONS TO ALL APPOINTMENTS    The diagnoses and medications listed in this after visit summary may not be accurate at the time of check out. Please check MY CHART in 28-48 hours for possible corrections. Late cancellation policy: So that we can better accommodate people who are sick, please give our office 24 hour notice for an appointment cancellation. Thank you. Missed appointments: Your care is very important to us. It is important that you keep your scheduled appointments. Multiple missed appointments will lead to a dismissal from the office. Later arrival policy: If you are more than 10 minutes late for your appointment, you will be asked to reschedule. Please allow 5-7 business days for paperwork to be processed. It is important that you check your MY Chart messages, as they include appointment reminders, test results, and other important information. If you have forgotten your password, please call [phone].          1. Take your blood pressure medications at night. This reduces your chance of cardiovascular event by half  2. Fever in kids: It's best to give both Tylenol and Ibuprofen at the same time rather than staggering them which is confusing  3. Pediatric obesity is decreased by less exposure to antibiotics, consuming whole milk instead of skim milk, watching public TV instead of regular TV, and experiencing fewer adverse childhood events  4. 1 egg per day is good for your heart  5. Alternate day fasting does promote weight loss. 6. Skipping breakfast increases your risk of obesity  7. Artificially sweetened drinks increase all cause mortality (strokes, BMI, cardiovascular)  8. Kale consumption can reduce onset of dementia  9. Walking at least 8000 steps per day and resistance exercise 2-3 x per week are good for your heart  10.  Covid 19:  Wearing gloves is not that helpful Having low vitamin D levels increases risk of infection (take 2-4000 units of D3 per day until our covid crisis is resolved)  11.  Brushing teeth 3 times per day can decrease chance of getting diabetes

## 2021-03-24 DIAGNOSIS — Z00.00 ANNUAL PHYSICAL EXAM: ICD-10-CM

## 2021-04-05 DIAGNOSIS — E11.29 TYPE 2 DIABETES MELLITUS WITH MICROALBUMINURIA, WITHOUT LONG-TERM CURRENT USE OF INSULIN (HCC): ICD-10-CM

## 2021-04-05 DIAGNOSIS — I10 ESSENTIAL HYPERTENSION: ICD-10-CM

## 2021-04-05 DIAGNOSIS — R80.9 TYPE 2 DIABETES MELLITUS WITH MICROALBUMINURIA, WITHOUT LONG-TERM CURRENT USE OF INSULIN (HCC): ICD-10-CM

## 2021-04-05 RX ORDER — GLIMEPIRIDE 2 MG/1
TABLET ORAL
Qty: 90 TABLET | Refills: 0 | OUTPATIENT
Start: 2021-04-05

## 2021-04-05 RX ORDER — LISINOPRIL 10 MG/1
TABLET ORAL
Qty: 90 TABLET | Refills: 0 | OUTPATIENT
Start: 2021-04-05

## 2021-04-12 DIAGNOSIS — I10 ESSENTIAL HYPERTENSION: ICD-10-CM

## 2021-04-12 DIAGNOSIS — R80.9 TYPE 2 DIABETES MELLITUS WITH MICROALBUMINURIA, WITHOUT LONG-TERM CURRENT USE OF INSULIN (HCC): ICD-10-CM

## 2021-04-12 DIAGNOSIS — E11.29 TYPE 2 DIABETES MELLITUS WITH MICROALBUMINURIA, WITHOUT LONG-TERM CURRENT USE OF INSULIN (HCC): ICD-10-CM

## 2021-04-12 RX ORDER — LISINOPRIL 10 MG/1
10 TABLET ORAL DAILY
Qty: 90 TABLET | Refills: 0 | Status: SHIPPED | OUTPATIENT
Start: 2021-04-12 | End: 2021-04-29 | Stop reason: DRUGHIGH

## 2021-04-12 RX ORDER — GLIMEPIRIDE 2 MG/1
2 TABLET ORAL EVERY MORNING
Qty: 90 TABLET | Refills: 0 | Status: SHIPPED | OUTPATIENT
Start: 2021-04-12 | End: 2021-04-29 | Stop reason: SDUPTHER

## 2021-04-28 ENCOUNTER — TELEPHONE (OUTPATIENT)
Dept: FAMILY MEDICINE CLINIC | Age: 36
End: 2021-04-28
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
## 2021-04-29 ENCOUNTER — OFFICE VISIT (OUTPATIENT)
Dept: FAMILY MEDICINE CLINIC | Age: 36
End: 2021-04-29
Payer: COMMERCIAL

## 2021-04-29 VITALS
BODY MASS INDEX: 34.66 KG/M2 | WEIGHT: 195.6 LBS | HEIGHT: 63 IN | DIASTOLIC BLOOD PRESSURE: 80 MMHG | SYSTOLIC BLOOD PRESSURE: 130 MMHG | TEMPERATURE: 97 F | HEART RATE: 86 BPM | OXYGEN SATURATION: 98 %

## 2021-04-29 DIAGNOSIS — E11.29 TYPE 2 DIABETES MELLITUS WITH MICROALBUMINURIA, WITHOUT LONG-TERM CURRENT USE OF INSULIN (HCC): Primary | ICD-10-CM

## 2021-04-29 DIAGNOSIS — E66.9 OBESITY (BMI 30.0-34.9): ICD-10-CM

## 2021-04-29 DIAGNOSIS — R80.9 TYPE 2 DIABETES MELLITUS WITH MICROALBUMINURIA, WITHOUT LONG-TERM CURRENT USE OF INSULIN (HCC): Primary | ICD-10-CM

## 2021-04-29 PROBLEM — E11.9 TYPE 2 DIABETES MELLITUS WITHOUT COMPLICATION, WITHOUT LONG-TERM CURRENT USE OF INSULIN (HCC): Status: RESOLVED | Noted: 2019-04-24 | Resolved: 2021-04-29

## 2021-04-29 PROBLEM — E66.01 SEVERE OBESITY WITH BODY MASS INDEX (BMI) OF 35.0 TO 39.9 WITH SERIOUS COMORBIDITY (HCC): Status: RESOLVED | Noted: 2019-06-13 | Resolved: 2021-04-29

## 2021-04-29 LAB
CREATININE URINE: 105 MG/DL (ref 28–259)
HBA1C MFR BLD: 7.8 %
MICROALBUMIN UR-MCNC: 1.5 MG/DL
MICROALBUMIN/CREAT UR-RTO: 14.3 MG/G (ref 0–30)

## 2021-04-29 PROCEDURE — 3051F HG A1C>EQUAL 7.0%<8.0%: CPT | Performed by: FAMILY MEDICINE

## 2021-04-29 PROCEDURE — 99214 OFFICE O/P EST MOD 30 MIN: CPT | Performed by: FAMILY MEDICINE

## 2021-04-29 PROCEDURE — 83036 HEMOGLOBIN GLYCOSYLATED A1C: CPT | Performed by: FAMILY MEDICINE

## 2021-04-29 RX ORDER — GLIMEPIRIDE 2 MG/1
2 TABLET ORAL 2 TIMES DAILY
Qty: 180 TABLET | Refills: 0 | Status: SHIPPED | OUTPATIENT
Start: 2021-04-29

## 2021-04-29 RX ORDER — METFORMIN HYDROCHLORIDE 500 MG/1
1000 TABLET, EXTENDED RELEASE ORAL
Qty: 360 TABLET | Refills: 0 | Status: SHIPPED | OUTPATIENT
Start: 2021-04-29

## 2021-04-29 RX ORDER — LISINOPRIL 20 MG/1
20 TABLET ORAL DAILY
Qty: 90 TABLET | Refills: 0 | Status: SHIPPED | OUTPATIENT
Start: 2021-04-29

## 2021-04-29 RX ORDER — EMPAGLIFLOZIN 25 MG/1
25 TABLET, FILM COATED ORAL DAILY
Qty: 90 TABLET | Refills: 0 | Status: SHIPPED | OUTPATIENT
Start: 2021-04-29

## 2021-04-29 RX ORDER — ACYCLOVIR 400 MG/1
400 TABLET ORAL DAILY
Qty: 90 TABLET | Refills: 3 | Status: SHIPPED | OUTPATIENT
Start: 2021-04-29

## 2021-04-29 ASSESSMENT — ENCOUNTER SYMPTOMS
VOMITING: 0
ABDOMINAL PAIN: 0
EYE REDNESS: 0
DIARRHEA: 0
SORE THROAT: 0
COUGH: 0
SHORTNESS OF BREATH: 0
RHINORRHEA: 0

## 2021-04-29 NOTE — PROGRESS NOTES
Patient ID: Lilla Lesches 1985    . Chief Complaint   Patient presents with    Hypertension    Diabetes         Diabetes  She presents for her follow-up diabetic visit. She has type 2 diabetes mellitus. Her disease course has been improving. Pertinent negatives for hypoglycemia include no headaches. Pertinent negatives for diabetes include no chest pain and no fatigue. There are no hypoglycemic complications. Symptoms are stable. Risk factors for coronary artery disease include obesity. Current diabetic treatment includes oral agent (dual therapy). She is compliant with treatment some of the time. Her weight is stable. Diabetic current diet: high calorie. Her overall blood glucose range is 140-180 mg/dl. Hyperlipidemia  The current episode started more than 1 year ago. Recent lipid tests were reviewed and are high. Pertinent negatives include no chest pain, myalgias or shortness of breath (no unusual). Treatments tried: unable to tolerate statin. Compliance problems include adherence to diet and adherence to exercise. Review of Systems   Constitutional: Negative for chills, fatigue and fever. HENT: Negative for rhinorrhea and sore throat. No loss of taste or smell sensation   Eyes: Negative for redness. Respiratory: Negative for cough (no unusual) and shortness of breath (no unusual). Cardiovascular: Negative for chest pain. Gastrointestinal: Negative for abdominal pain, diarrhea and vomiting. Musculoskeletal: Negative for arthralgias, gait problem and myalgias. Skin: Negative for rash. Neurological: Negative for headaches. Hematological: Does not bruise/bleed easily. Patient Active Problem List   Diagnosis    Type 2 diabetes mellitus with microalbuminuria ([address]    Mixed hyperlipidemia    Exposure to second hand tobacco smoke    Obesity (BMI 30.0-34.9)       No past surgical history on file.     Family History   Problem Relation Age of Onset    Diabetes Mother and S2 normal. No murmur. No friction rub. No gallop. Comments: No carotid bruits  Pulmonary:      Effort: Pulmonary effort is normal. No respiratory distress. Breath sounds: Normal breath sounds. No wheezing or rales. Abdominal:      General: There is no distension. Palpations: Abdomen is soft. There is no mass. Tenderness: There is no abdominal tenderness. Musculoskeletal:      Right foot: Normal range of motion. No deformity. Left foot: Normal range of motion. No deformity. Feet:      Right foot:      Protective Sensation: 5 sites tested. 5 sites sensed. Skin integrity: Skin integrity normal. No ulcer, erythema, callus or dry skin. Toenail Condition: Right toenails are normal.      Left foot:      Protective Sensation: 5 sites tested. 5 sites sensed. Skin integrity: Skin integrity normal. No ulcer, erythema, callus or dry skin. Toenail Condition: Left toenails are normal.   Skin:     General: Skin is warm and dry. Neurological:      Mental Status: She is alert and oriented to person, place, and time. Comments:          Vitals:    04/29/21 1620   BP: 130/80   Site: Right Upper Arm   Position: Sitting   Cuff Size: Large Adult   Pulse: 86   Temp: 97 °F (36.1 °C)   TempSrc: Infrared   SpO2: 98%   Weight: 195 lb 9.6 oz (88.7 kg)   Height: 5' 3\" (1.6 m)     Body mass index is 34.65 kg/m². Wt Readings from Last 3 Encounters:   04/29/21 195 lb 9.6 oz (88.7 kg)   08/18/20 203 lb 9.6 oz (92.4 kg)   03/26/20 202 lb 6.4 oz (91.8 kg)     BP Readings from Last 3 Encounters:   04/29/21 130/80   08/18/20 128/74   03/26/20 (!) 150/80          Results for orders placed or performed in visit on 04/29/21   POCT glycosylated hemoglobin (Hb A1C)   Result Value Ref Range    Hemoglobin A1C 7.8 %     The ASCVD Risk score (Roxy Bethea., et al., 2013) failed to calculate for the following reasons:     The 2013 ASCVD risk score is only valid for ages 36 to 78  Lab Review   No

## 2021-04-29 NOTE — PATIENT INSTRUCTIONS
Advance Directives: Care Instructions  Your Care Instructions  An advance directive is a legal way to state your wishes at the end of your life. It tells your family and your doctor what to do if you can no longer say what you want. There are two main types of advance directives. You can change them any time that your wishes change. · A living will tells your family and your doctor your wishes about life support and other treatment. · A durable power of  for health care lets you name a person to make treatment decisions for you when you can't speak for yourself. This person is called a health care agent. If you do not have an advance directive, decisions about your medical care may be made by a doctor or a  who doesn't know you. It may help to think of an advance directive as a gift to the people who care for you. If you have one, they won't have to make tough decisions by themselves. Follow-up care is a key part of your treatment and safety. Be sure to make and go to all appointments, and call your doctor if you are having problems. It's also a good idea to know your test results and keep a list of the medicines you take. How can you care for yourself at home? · Discuss your wishes with your loved ones and your doctor. This way, there are no surprises. · Many states have a unique form. Or you might use a universal form that has been approved by many states. This kind of form can sometimes be completed and stored online. Your electronic copy will then be available wherever you have a connection to the Internet. In most cases, doctors will respect your wishes even if you have a form from a different state. · You don't need a  to do an advance directive. But you may want to get legal advice. · Think about these questions when you prepare an advance directive:  ? Who do you want to make decisions about your medical care if you are not able to?  Many people choose a family member or close friend. ? Do you know enough about life support methods that might be used? If not, talk to your doctor so you understand. ? What are you most afraid of that might happen? You might be afraid of having pain, losing your independence, or being kept alive by machines. ? Where would you prefer to die? Choices include your home, a hospital, or a nursing home. ? Would you like to have information about hospice care to support you and your family? ? Do you want to donate organs when you die? ? Do you want certain [employer] practices performed before you die? If so, put your wishes in the advance directive. · Read your advance directive every year, and make changes as needed. When should you call for help? Be sure to contact your doctor if you have any questions. Where can you learn more? Learning About Living [place] Roman  What is a living will? A living will is a legal form you use to write down the kind of care you want at the end of your life. It is used by the health professionals who will treat you if you aren't able to decide for yourself. If you put your wishes in writing, your loved ones and others will know what kind of care you want. They won't need to guess. This can ease your mind and be helpful to others. A living will is not the same as an estate or property will. An estate will explains what you want to happen with your money and property after you die. Is a living will a legal document? A living will is a legal document. Each state has its own laws about living agee. If you move to another state, make sure that your living will is legal in the state where you now live. Or you might use a universal form that has been approved by many states. This kind of form can sometimes be completed and stored online. Your electronic copy will then be available wherever you have a connection to the Internet.  In most cases, doctors will respect your wishes even if you have a form from a different state.  · You don't need an  to complete a living will. But legal advice can be helpful if your state's laws are unclear, your health history is complicated, or your family can't agree on what should be in your living will. · You can change your living will at any time. Some people find that their wishes about end-of-life care change as their health changes. · In addition to making a living will, think about completing a medical power of  form. This form lets you name the person you want to make end-of-life treatment decisions for you (your \"health care agent\") if you're not able to. Many hospitals and nursing homes will give you the forms you need to complete a living will and a medical power of . · Your living will is used only if you can't make or communicate decisions for yourself anymore. If you become able to make decisions again, you can accept or refuse any treatment, no matter what you wrote in your living will. · Your state may offer an online registry. This is a place where you can store your living will online so the doctors and nurses who need to treat you can find it right away. What should you think about when creating a living will? Talk about your end-of-life wishes with your family members and your doctor. Let them know what you want. That way the people making decisions for you won't be surprised by your choices. Think about these questions as you make your living will:  · Do you know enough about life support methods that might be used? If not, talk to your doctor so you know what might be done if you can't breathe on your own, your heart stops, or you're unable to swallow. · What things would you still want to be able to do after you receive life-support methods? Would you want to be able to walk? To speak? To eat on your own? To live without the help of machines? · If you have a choice, where do you want to be cared for? In your home?  At a hospital or nursing home?  · Do you want certain Latter day practices performed if you become very ill? · If you have a choice at the end of your life, where would you prefer to die? At home? In a hospital or nursing home? Somewhere else? · Would you prefer to be buried or cremated? · Do you want your organs to be donated after you die? What should you do with your living will? · Make sure that your family members and your health care agent have copies of your living will. · Give your doctor a copy of your living will to keep in your medical record. If you have more than one doctor, make sure that each one has a copy. · You may want to put a copy of your living will where it can be easily found. Where can you learn more? Go to [email or web address] org and sign in to your [employer] account. Enter E868 in the [employer] box to learn more about \"Learning About Living [place]. \"     If you do not have an account, please click on the \"Sign Up Now\" link. Current as of: April 1, 2019  Content Version: 12.1  © 3700-3968 Healthwise, Incorporated. Care instructions adapted under license by [facility] ([facility]). If you have questions about a medical condition or this instruction, always ask your healthcare professional. [address] any warranty or liability for your use of this information. Need help scheduling your covid vaccine? Call [facility] hotline: [phone] or go to [address] [place] [address] AT [address] [place] [address] TO ALL APPOINTMENTS    The diagnoses and medications listed in this after visit summary may not be accurate at the time of check out. Please check MY CHART in 28-48 hours for possible corrections. Late cancellation policy: So that we can better accommodate people who are sick, please give our office 24 hour notice for an appointment cancellation. Thank you.     Missed appointments: Your care is very important to us. It is important that you keep your scheduled appointments. Multiple missed appointments will lead to a dismissal from the office. Later arrival policy: If you are more than 10 minutes late for your appointment, you will be asked to re-schedule. Please allow 5-7 business days for paperwork to be processed. It is important that you check your MY Chart messages, as they include appointment reminders, test results, and other important information. If you have forgotten your password, please call [phone]. HERE ARE SOME LIFE CHANGING TIPS      1. Take your blood pressure medications at bedtime. This reduces your chance of cardiovascular event by half  2. Fever in kids:  Give both Tylenol and Ibuprofen at the same time rather than staggering them which is confusing  3. Follow these tips to reduce childhood obesity: Reduce unnecessary exposure to antibiotics, consume whole milk instead of skim milk, watch public TV instead of regular TV (less exposure to junk food commercials), and reduce traumatic experiences. 4. 1 egg per day is good for your heart  5. Alternate day fasting does promote weight loss. Skipping breakfast increases your risk of obesity  6. Artificially sweetened drinks increase all cause mortality (strokes, BMI, cardiovascular)  7. Kale consumption can reduce onset of dementia  8. Walking at least 8000 steps per day and resistance exercise 2-3 x per week are good for your heart  9.  Brushing teeth 3 times per day can decrease chance of getting diabetes

## 2021-07-08 DIAGNOSIS — E78.2 MIXED HYPERLIPIDEMIA: ICD-10-CM

## 2021-07-08 RX ORDER — ROSUVASTATIN CALCIUM 20 MG/1
TABLET, COATED ORAL
Qty: 90 TABLET | Refills: 3 | OUTPATIENT
Start: 2021-07-08

## 2021-08-01 DIAGNOSIS — E11.29 TYPE 2 DIABETES MELLITUS WITH MICROALBUMINURIA, WITHOUT LONG-TERM CURRENT USE OF INSULIN (HCC): ICD-10-CM

## 2021-08-01 DIAGNOSIS — R80.9 TYPE 2 DIABETES MELLITUS WITH MICROALBUMINURIA, WITHOUT LONG-TERM CURRENT USE OF INSULIN (HCC): ICD-10-CM

## 2021-08-02 RX ORDER — LISINOPRIL 20 MG/1
TABLET ORAL
Qty: 90 TABLET | Refills: 0 | OUTPATIENT
Start: 2021-08-02

## 2021-08-15 DIAGNOSIS — E11.29 TYPE 2 DIABETES MELLITUS WITH MICROALBUMINURIA, WITHOUT LONG-TERM CURRENT USE OF INSULIN (HCC): ICD-10-CM

## 2021-08-15 DIAGNOSIS — R80.9 TYPE 2 DIABETES MELLITUS WITH MICROALBUMINURIA, WITHOUT LONG-TERM CURRENT USE OF INSULIN (HCC): ICD-10-CM

## 2021-08-16 RX ORDER — GLIMEPIRIDE 2 MG/1
2 TABLET ORAL 2 TIMES DAILY
Qty: 180 TABLET | Refills: 0 | OUTPATIENT
Start: 2021-08-16

## 2021-08-22 DIAGNOSIS — R80.9 TYPE 2 DIABETES MELLITUS WITH MICROALBUMINURIA, WITHOUT LONG-TERM CURRENT USE OF INSULIN (HCC): ICD-10-CM

## 2021-08-22 DIAGNOSIS — Z00.00 ANNUAL PHYSICAL EXAM: ICD-10-CM

## 2021-08-22 DIAGNOSIS — E11.29 TYPE 2 DIABETES MELLITUS WITH MICROALBUMINURIA, WITHOUT LONG-TERM CURRENT USE OF INSULIN (HCC): ICD-10-CM

## 2021-08-23 RX ORDER — LISINOPRIL 20 MG/1
TABLET ORAL
Qty: 90 TABLET | Refills: 0 | OUTPATIENT
Start: 2021-08-23

## 2021-08-23 RX ORDER — GLIMEPIRIDE 2 MG/1
2 TABLET ORAL 2 TIMES DAILY
Qty: 180 TABLET | Refills: 0 | OUTPATIENT
Start: 2021-08-23

## 2021-08-28 DIAGNOSIS — R80.9 TYPE 2 DIABETES MELLITUS WITH MICROALBUMINURIA, WITHOUT LONG-TERM CURRENT USE OF INSULIN (HCC): ICD-10-CM

## 2021-08-28 DIAGNOSIS — E11.29 TYPE 2 DIABETES MELLITUS WITH MICROALBUMINURIA, WITHOUT LONG-TERM CURRENT USE OF INSULIN (HCC): ICD-10-CM

## 2021-08-29 DIAGNOSIS — Z00.00 ANNUAL PHYSICAL EXAM: ICD-10-CM

## 2021-08-29 DIAGNOSIS — E78.2 MIXED HYPERLIPIDEMIA: ICD-10-CM

## 2021-08-29 DIAGNOSIS — E11.29 TYPE 2 DIABETES MELLITUS WITH MICROALBUMINURIA, WITHOUT LONG-TERM CURRENT USE OF INSULIN (HCC): ICD-10-CM

## 2021-08-29 DIAGNOSIS — R80.9 TYPE 2 DIABETES MELLITUS WITH MICROALBUMINURIA, WITHOUT LONG-TERM CURRENT USE OF INSULIN (HCC): ICD-10-CM

## 2021-08-30 RX ORDER — LISINOPRIL 20 MG/1
TABLET ORAL
Qty: 90 TABLET | Refills: 0 | OUTPATIENT
Start: 2021-08-30

## 2021-08-30 RX ORDER — GLIMEPIRIDE 2 MG/1
2 TABLET ORAL 2 TIMES DAILY
Qty: 180 TABLET | Refills: 0 | OUTPATIENT
Start: 2021-08-30

## 2021-08-30 RX ORDER — ROSUVASTATIN CALCIUM 20 MG/1
TABLET, COATED ORAL
Qty: 90 TABLET | Refills: 3 | OUTPATIENT
Start: 2021-08-30

## 2022-01-01 NOTE — LETTER
[facility] [address]  Phone: [phone]  Fax: [phone]    Celeste Stephenson MD        June 13, 2019     Patient: Tennille Shaver   YOB: 1985   Date of Visit: 6/13/2019       To Whom It May Concern: It is my medical opinion that Jodie Corado would benefit from a stand up desk. If you have any questions or concerns, please don't hesitate to call.     Sincerely,        Celeste Stephenson MD
0

## 2022-04-26 RX ORDER — ACYCLOVIR 400 MG/1
TABLET ORAL
Qty: 90 TABLET | Refills: 3 | OUTPATIENT
Start: 2022-04-26